# Patient Record
Sex: FEMALE | Race: WHITE | Employment: OTHER | URBAN - METROPOLITAN AREA
[De-identification: names, ages, dates, MRNs, and addresses within clinical notes are randomized per-mention and may not be internally consistent; named-entity substitution may affect disease eponyms.]

---

## 2018-07-03 PROBLEM — J06.9 URI, ACUTE: Status: ACTIVE | Noted: 2018-07-03

## 2018-12-06 ENCOUNTER — HOSPITAL ENCOUNTER (OUTPATIENT)
Dept: MAMMOGRAPHY | Age: 83
Discharge: HOME | End: 2018-12-06
Attending: INTERNAL MEDICINE
Payer: MEDICARE

## 2018-12-06 DIAGNOSIS — M81.0 OSTEOPOROSIS, UNSPECIFIED OSTEOPOROSIS TYPE, UNSPECIFIED PATHOLOGICAL FRACTURE PRESENCE: ICD-10-CM

## 2018-12-06 PROCEDURE — 77080 DXA BONE DENSITY AXIAL: CPT

## 2018-12-14 PROBLEM — M85.80 OSTEOPENIA: Status: ACTIVE | Noted: 2018-12-14

## 2018-12-14 NOTE — PROGRESS NOTES
DEXA bone density scan shows osteopenia with increased hip fracture risk so recommend Prolia injections every 6 months.  Please inform the patient

## 2018-12-14 NOTE — PROGRESS NOTES
I called and notified the pt of these results/recommendations. The pt asked that I speak with her daughter Conner Whtie - on [place]), as well. She was notified. They want to go forth with the Prolia injections, but they want ensure that none of her medications have a contraindication with the Prolia. I notified I would send the msg to Dr. Elvira Cantu, but it would be Monday before I would get back in touch with her. She verbalized understanding.

## 2019-01-16 ENCOUNTER — APPOINTMENT (OUTPATIENT)
Dept: INFUSION THERAPY | Age: 84
End: 2019-01-16

## 2019-02-13 PROBLEM — J06.9 URI, ACUTE: Status: RESOLVED | Noted: 2018-07-03 | Resolved: 2019-02-13

## 2020-03-11 ENCOUNTER — HOSPITAL ENCOUNTER (OUTPATIENT)
Dept: INFUSION THERAPY | Age: 85
Discharge: HOME | End: 2020-03-11
Payer: MEDICARE

## 2020-03-11 VITALS
OXYGEN SATURATION: 95 % | TEMPERATURE: 97.7 F | RESPIRATION RATE: 18 BRPM | SYSTOLIC BLOOD PRESSURE: 119 MMHG | DIASTOLIC BLOOD PRESSURE: 71 MMHG | HEART RATE: 72 BPM

## 2020-03-11 LAB
CALCIUM SERPL-MCNC: 9.6 MG/DL (ref 8.3–10.4)
CREAT SERPL-MCNC: 0.94 MG/DL (ref 0.6–1)

## 2020-03-11 PROCEDURE — 36415 COLL VENOUS BLD VENIPUNCTURE: CPT

## 2020-03-11 PROCEDURE — 74011250636 HC RX REV CODE- 250/636: Performed by: INTERNAL MEDICINE

## 2020-03-11 PROCEDURE — 96372 THER/PROPH/DIAG INJ SC/IM: CPT

## 2020-03-11 PROCEDURE — 82565 ASSAY OF CREATININE: CPT

## 2020-03-11 PROCEDURE — 82310 ASSAY OF CALCIUM: CPT

## 2020-03-11 RX ADMIN — DENOSUMAB 60 MG: 60 INJECTION SUBCUTANEOUS at 16:15

## 2020-03-11 NOTE — PROGRESS NOTES
Arrived to the [facility] at the [facility]. Prolia completed. Provided education on Prolia  Patient instructed to report any side affects to ordering provider.   Patient tolerated well  Any issues or concerns during appointment: No  Patient aware of next infusion appointment on Friday,September 11th @ 1400  Discharged home ambulatory

## 2020-09-11 ENCOUNTER — HOSPITAL ENCOUNTER (OUTPATIENT)
Dept: INFUSION THERAPY | Age: 85
Discharge: HOME | End: 2020-09-11
Payer: MEDICARE

## 2020-09-11 VITALS
TEMPERATURE: 97.6 F | SYSTOLIC BLOOD PRESSURE: 145 MMHG | RESPIRATION RATE: 18 BRPM | HEART RATE: 87 BPM | OXYGEN SATURATION: 95 % | DIASTOLIC BLOOD PRESSURE: 60 MMHG

## 2020-09-11 LAB — CALCIUM SERPL-MCNC: 9.8 MG/DL (ref 8.3–10.4)

## 2020-09-11 PROCEDURE — 36415 COLL VENOUS BLD VENIPUNCTURE: CPT

## 2020-09-11 PROCEDURE — 74011250636 HC RX REV CODE- 250/636: Performed by: INTERNAL MEDICINE

## 2020-09-11 PROCEDURE — 96372 THER/PROPH/DIAG INJ SC/IM: CPT

## 2020-09-11 PROCEDURE — 82310 ASSAY OF CALCIUM: CPT

## 2020-09-11 RX ADMIN — DENOSUMAB 60 MG: 60 INJECTION SUBCUTANEOUS at 14:20

## 2020-09-11 NOTE — PROGRESS NOTES
Arrived to the [facility]. Prolia completed.    Provided education on Prolia-patient has previously received this medication   Patient instructed to report any side affects to ordering provider-  Patient tolerated well   Any issues or concerns during appointment: No  Patient has no futrue appointments in [facility] @ this time  Discharged home ambulatory with daughter

## 2021-02-19 ENCOUNTER — HOSPITAL ENCOUNTER (OUTPATIENT)
Dept: MAMMOGRAPHY | Age: 86
Discharge: HOME | End: 2021-02-19
Attending: INTERNAL MEDICINE
Payer: MEDICARE

## 2021-02-19 DIAGNOSIS — M85.80 OSTEOPENIA, UNSPECIFIED LOCATION: ICD-10-CM

## 2021-02-19 DIAGNOSIS — Z78.0 POSTMENOPAUSAL: ICD-10-CM

## 2021-02-19 PROCEDURE — 77080 DXA BONE DENSITY AXIAL: CPT

## 2021-02-23 NOTE — PROGRESS NOTES
DEXA bone density scan report reviewed and shows weak bones with increased risk of fracture.   Continue Prolia

## 2021-03-11 ENCOUNTER — HOSPITAL ENCOUNTER (OUTPATIENT)
Dept: LAB | Age: 86
Discharge: HOME | End: 2021-03-11

## 2021-03-11 ENCOUNTER — HOSPITAL ENCOUNTER (OUTPATIENT)
Dept: INFUSION THERAPY | Age: 86
Discharge: HOME | End: 2021-03-11
Payer: MEDICARE

## 2021-03-11 VITALS
DIASTOLIC BLOOD PRESSURE: 83 MMHG | TEMPERATURE: 98.2 F | OXYGEN SATURATION: 99 % | SYSTOLIC BLOOD PRESSURE: 121 MMHG | HEART RATE: 63 BPM

## 2021-03-11 LAB
CALCIUM SERPL-MCNC: 10.1 MG/DL (ref 8.3–10.4)
CREAT SERPL-MCNC: 1 MG/DL (ref 0.6–1)

## 2021-03-11 PROCEDURE — 96372 THER/PROPH/DIAG INJ SC/IM: CPT

## 2021-03-11 PROCEDURE — 36415 COLL VENOUS BLD VENIPUNCTURE: CPT

## 2021-03-11 PROCEDURE — 74011250636 HC RX REV CODE- 250/636: Performed by: INTERNAL MEDICINE

## 2021-03-11 PROCEDURE — 82310 ASSAY OF CALCIUM: CPT

## 2021-03-11 PROCEDURE — 82565 ASSAY OF CREATININE: CPT

## 2021-03-11 RX ADMIN — DENOSUMAB 60 MG: 60 INJECTION SUBCUTANEOUS at 14:40

## 2021-03-11 NOTE — PROGRESS NOTES
Arrived to the [facility]. Jamari completed. Provided education on same. Patient instructed to report any side affects to ordering provider. Patient tolerated well. Any issues or concerns during appointment: none. Patient taken to scheduling to make next appt. Discharged ambulatory with family.

## 2021-05-18 ENCOUNTER — HOSPITAL ENCOUNTER (OUTPATIENT)
Dept: NUCLEAR MEDICINE | Age: 86
Discharge: HOME | End: 2021-05-18
Attending: INTERNAL MEDICINE
Payer: MEDICARE

## 2021-05-18 DIAGNOSIS — D75.9 BONE MARROW DISORDER: ICD-10-CM

## 2021-05-18 DIAGNOSIS — M25.551 HIP PAIN, ACUTE, RIGHT: ICD-10-CM

## 2021-05-18 PROCEDURE — 78306 BONE IMAGING WHOLE BODY: CPT

## 2021-07-23 ENCOUNTER — HOSPITAL ENCOUNTER (OUTPATIENT)
Dept: MAMMOGRAPHY | Age: 86
Discharge: HOME | End: 2021-07-23
Attending: INTERNAL MEDICINE
Payer: MEDICARE

## 2021-07-23 DIAGNOSIS — Z12.31 SCREENING MAMMOGRAM FOR HIGH-RISK PATIENT: ICD-10-CM

## 2021-07-23 PROCEDURE — 77067 SCR MAMMO BI INCL CAD: CPT

## 2021-08-25 ENCOUNTER — HOSPITAL ENCOUNTER (OUTPATIENT)
Dept: MAMMOGRAPHY | Age: 86
Discharge: HOME | End: 2021-08-25
Attending: INTERNAL MEDICINE
Payer: MEDICARE

## 2021-08-25 DIAGNOSIS — R92.8 ABNORMAL SCREENING MAMMOGRAM: ICD-10-CM

## 2021-08-25 PROCEDURE — 77066 DX MAMMO INCL CAD BI: CPT

## 2021-08-25 PROCEDURE — 76642 ULTRASOUND BREAST LIMITED: CPT

## 2021-08-25 NOTE — PROGRESS NOTES
Follow up breast mammogram and ultrasound report reviewed and detected only benign changes and recommend follow up scan in 6 months.

## 2021-09-13 ENCOUNTER — HOSPITAL ENCOUNTER (OUTPATIENT)
Dept: LAB | Age: 86
Discharge: HOME | End: 2021-09-13

## 2021-09-13 ENCOUNTER — HOSPITAL ENCOUNTER (OUTPATIENT)
Dept: INFUSION THERAPY | Age: 86
Discharge: HOME | End: 2021-09-13
Payer: MEDICARE

## 2021-09-13 VITALS
SYSTOLIC BLOOD PRESSURE: 132 MMHG | TEMPERATURE: 98 F | DIASTOLIC BLOOD PRESSURE: 66 MMHG | HEART RATE: 65 BPM | RESPIRATION RATE: 18 BRPM

## 2021-09-13 LAB
CALCIUM SERPL-MCNC: 10.3 MG/DL (ref 8.3–10.4)
CREAT SERPL-MCNC: 1.1 MG/DL (ref 0.6–1)

## 2021-09-13 PROCEDURE — 36415 COLL VENOUS BLD VENIPUNCTURE: CPT

## 2021-09-13 PROCEDURE — 82310 ASSAY OF CALCIUM: CPT

## 2021-09-13 PROCEDURE — 96372 THER/PROPH/DIAG INJ SC/IM: CPT

## 2021-09-13 PROCEDURE — 74011250636 HC RX REV CODE- 250/636: Performed by: INTERNAL MEDICINE

## 2021-09-13 PROCEDURE — 82565 ASSAY OF CREATININE: CPT

## 2021-09-13 RX ADMIN — DENOSUMAB 60 MG: 60 INJECTION SUBCUTANEOUS at 14:34

## 2021-09-13 NOTE — PROGRESS NOTES
Arrived to infusion. Labs reviewed and prolia given. Educated patient on injection. No concerns or issues expressed. Next prolia is due in 6 months and will need new order. Patient d/c ambulatory with family member.

## 2022-03-14 ENCOUNTER — HOSPITAL ENCOUNTER (OUTPATIENT)
Dept: LAB | Age: 87
Discharge: HOME | End: 2022-03-14

## 2022-03-14 ENCOUNTER — HOSPITAL ENCOUNTER (OUTPATIENT)
Dept: INFUSION THERAPY | Age: 87
Discharge: HOME | End: 2022-03-14
Payer: MEDICARE

## 2022-03-14 VITALS
SYSTOLIC BLOOD PRESSURE: 104 MMHG | RESPIRATION RATE: 18 BRPM | OXYGEN SATURATION: 94 % | DIASTOLIC BLOOD PRESSURE: 54 MMHG | HEART RATE: 70 BPM | TEMPERATURE: 98 F

## 2022-03-14 DIAGNOSIS — M85.80 OSTEOPENIA, UNSPECIFIED LOCATION: Primary | ICD-10-CM

## 2022-03-14 LAB
CALCIUM SERPL-MCNC: 10.2 MG/DL (ref 8.3–10.4)
CREAT SERPL-MCNC: 1.7 MG/DL (ref 0.6–1)

## 2022-03-14 PROCEDURE — 96372 THER/PROPH/DIAG INJ SC/IM: CPT

## 2022-03-14 PROCEDURE — 36415 COLL VENOUS BLD VENIPUNCTURE: CPT

## 2022-03-14 PROCEDURE — 74011250636 HC RX REV CODE- 250/636: Performed by: INTERNAL MEDICINE

## 2022-03-14 PROCEDURE — 82565 ASSAY OF CREATININE: CPT

## 2022-03-14 PROCEDURE — 82310 ASSAY OF CALCIUM: CPT

## 2022-03-14 RX ORDER — ACETAMINOPHEN 325 MG/1
650 TABLET ORAL AS NEEDED
Start: 2022-09-12

## 2022-03-14 RX ORDER — HYDROCORTISONE SODIUM SUCCINATE 100 MG/2ML
100 INJECTION, POWDER, FOR SOLUTION INTRAMUSCULAR; INTRAVENOUS AS NEEDED
OUTPATIENT
Start: 2022-09-12

## 2022-03-14 RX ORDER — DIPHENHYDRAMINE HYDROCHLORIDE 50 MG/ML
50 INJECTION, SOLUTION INTRAMUSCULAR; INTRAVENOUS AS NEEDED
Start: 2022-09-12

## 2022-03-14 RX ORDER — ONDANSETRON 2 MG/ML
8 INJECTION INTRAMUSCULAR; INTRAVENOUS AS NEEDED
OUTPATIENT
Start: 2022-09-12

## 2022-03-14 RX ORDER — DIPHENHYDRAMINE HYDROCHLORIDE 50 MG/ML
25 INJECTION, SOLUTION INTRAMUSCULAR; INTRAVENOUS AS NEEDED
Start: 2022-09-12

## 2022-03-14 RX ORDER — ALBUTEROL SULFATE 0.83 MG/ML
2.5 SOLUTION RESPIRATORY (INHALATION) AS NEEDED
Start: 2022-09-12

## 2022-03-14 RX ORDER — EPINEPHRINE 1 MG/ML
0.3 INJECTION, SOLUTION, CONCENTRATE INTRAVENOUS AS NEEDED
OUTPATIENT
Start: 2022-09-12

## 2022-03-14 RX ADMIN — DENOSUMAB 60 MG: 60 INJECTION SUBCUTANEOUS at 14:31

## 2022-03-14 NOTE — PROGRESS NOTES
Arrived to the [facility]. Prolia injection completed. Provided education on Prolia. Patient has had medication previously   Patient instructed to report any side affects to ordering provider.   Patient tolerated well  Any issues or concerns during appointment: none  Patient aware to follow up with physician for next appointment   Discharged ambulatory

## 2022-03-15 ENCOUNTER — HOSPITAL ENCOUNTER (OUTPATIENT)
Dept: MAMMOGRAPHY | Age: 87
Discharge: HOME | End: 2022-03-15
Attending: INTERNAL MEDICINE
Payer: MEDICARE

## 2022-03-15 DIAGNOSIS — N64.89 BREAST ASYMMETRY: ICD-10-CM

## 2022-03-15 PROCEDURE — 77066 DX MAMMO INCL CAD BI: CPT

## 2022-03-19 PROBLEM — M85.80 OSTEOPENIA: Status: ACTIVE | Noted: 2018-12-14

## 2022-06-06 DIAGNOSIS — F01.50 VASCULAR DEMENTIA WITHOUT BEHAVIORAL DISTURBANCE (HCC): ICD-10-CM

## 2022-06-06 RX ORDER — FLUOXETINE HYDROCHLORIDE 20 MG/5ML
LIQUID ORAL
Qty: 240 ML | Refills: 0 | Status: SHIPPED | OUTPATIENT
Start: 2022-06-06 | End: 2022-08-08

## 2022-06-06 NOTE — TELEPHONE ENCOUNTER
Pt was last seen in february but has an upcoming appointment in august rx was last sent for 60 days 1 refill was not sure if she needs to be seen sooner for this medication or it can be sent In until her next visit please advise

## 2022-08-05 DIAGNOSIS — F01.50 VASCULAR DEMENTIA WITHOUT BEHAVIORAL DISTURBANCE (HCC): ICD-10-CM

## 2022-08-08 RX ORDER — FLUOXETINE HYDROCHLORIDE 20 MG/5ML
LIQUID ORAL
Qty: 240 ML | Refills: 0 | Status: SHIPPED | OUTPATIENT
Start: 2022-08-08 | End: 2022-09-06

## 2022-08-22 ENCOUNTER — OFFICE VISIT (OUTPATIENT)
Dept: INTERNAL MEDICINE CLINIC | Facility: CLINIC | Age: 87
End: 2022-08-22
Payer: MEDICARE

## 2022-08-22 VITALS
HEIGHT: 60 IN | DIASTOLIC BLOOD PRESSURE: 87 MMHG | OXYGEN SATURATION: 97 % | SYSTOLIC BLOOD PRESSURE: 158 MMHG | WEIGHT: 119 LBS | TEMPERATURE: 97.4 F | HEART RATE: 62 BPM | BODY MASS INDEX: 23.36 KG/M2

## 2022-08-22 DIAGNOSIS — E78.49 OTHER HYPERLIPIDEMIA: ICD-10-CM

## 2022-08-22 DIAGNOSIS — F03.90 DEMENTIA WITHOUT BEHAVIORAL DISTURBANCE, UNSPECIFIED DEMENTIA TYPE: ICD-10-CM

## 2022-08-22 DIAGNOSIS — M85.80 OSTEOPENIA, UNSPECIFIED LOCATION: ICD-10-CM

## 2022-08-22 DIAGNOSIS — G62.9 PERIPHERAL POLYNEUROPATHY: ICD-10-CM

## 2022-08-22 DIAGNOSIS — N32.81 OAB (OVERACTIVE BLADDER): ICD-10-CM

## 2022-08-22 DIAGNOSIS — I10 ESSENTIAL HYPERTENSION: Primary | ICD-10-CM

## 2022-08-22 DIAGNOSIS — J30.9 ALLERGIC RHINITIS, UNSPECIFIED SEASONALITY, UNSPECIFIED TRIGGER: ICD-10-CM

## 2022-08-22 DIAGNOSIS — I10 ESSENTIAL HYPERTENSION: ICD-10-CM

## 2022-08-22 DIAGNOSIS — F31.9 BIPOLAR AFFECTIVE DISORDER, REMISSION STATUS UNSPECIFIED (HCC): ICD-10-CM

## 2022-08-22 LAB
BASOPHILS # BLD: 0 K/UL (ref 0–0.2)
BASOPHILS NFR BLD: 1 % (ref 0–2)
DIFFERENTIAL METHOD BLD: ABNORMAL
EOSINOPHIL # BLD: 0.1 K/UL (ref 0–0.8)
EOSINOPHIL NFR BLD: 3 % (ref 0.5–7.8)
ERYTHROCYTE [DISTWIDTH] IN BLOOD BY AUTOMATED COUNT: 13.6 % (ref 11.9–14.6)
HCT VFR BLD AUTO: 38.2 % (ref 35.8–46.3)
HGB BLD-MCNC: 12 G/DL (ref 11.7–15.4)
IMM GRANULOCYTES # BLD AUTO: 0 K/UL (ref 0–0.5)
IMM GRANULOCYTES NFR BLD AUTO: 0 % (ref 0–5)
LYMPHOCYTES # BLD: 1.4 K/UL (ref 0.5–4.6)
LYMPHOCYTES NFR BLD: 32 % (ref 13–44)
MCH RBC QN AUTO: 28.6 PG (ref 26.1–32.9)
MCHC RBC AUTO-ENTMCNC: 31.4 G/DL (ref 31.4–35)
MCV RBC AUTO: 91 FL (ref 79.6–97.8)
MONOCYTES # BLD: 0.4 K/UL (ref 0.1–1.3)
MONOCYTES NFR BLD: 9 % (ref 4–12)
NEUTS SEG # BLD: 2.4 K/UL (ref 1.7–8.2)
NEUTS SEG NFR BLD: 55 % (ref 43–78)
NRBC # BLD: 0 K/UL (ref 0–0.2)
PLATELET # BLD AUTO: 212 K/UL (ref 150–450)
PMV BLD AUTO: 12.6 FL (ref 9.4–12.3)
RBC # BLD AUTO: 4.2 M/UL (ref 4.05–5.2)
WBC # BLD AUTO: 4.3 K/UL (ref 4.3–11.1)

## 2022-08-22 PROCEDURE — 1123F ACP DISCUSS/DSCN MKR DOCD: CPT | Performed by: INTERNAL MEDICINE

## 2022-08-22 PROCEDURE — 1090F PRES/ABSN URINE INCON ASSESS: CPT | Performed by: INTERNAL MEDICINE

## 2022-08-22 PROCEDURE — G8427 DOCREV CUR MEDS BY ELIG CLIN: HCPCS | Performed by: INTERNAL MEDICINE

## 2022-08-22 PROCEDURE — 1036F TOBACCO NON-USER: CPT | Performed by: INTERNAL MEDICINE

## 2022-08-22 PROCEDURE — 99214 OFFICE O/P EST MOD 30 MIN: CPT | Performed by: INTERNAL MEDICINE

## 2022-08-22 PROCEDURE — G8420 CALC BMI NORM PARAMETERS: HCPCS | Performed by: INTERNAL MEDICINE

## 2022-08-22 RX ORDER — ALBUTEROL SULFATE 90 UG/1
4 AEROSOL, METERED RESPIRATORY (INHALATION) PRN
OUTPATIENT
Start: 2022-08-22

## 2022-08-22 RX ORDER — ESTRADIOL 1 MG/1
1 TABLET ORAL DAILY
Qty: 90 TABLET | Refills: 1 | Status: SHIPPED | OUTPATIENT
Start: 2022-08-22

## 2022-08-22 RX ORDER — DIPHENHYDRAMINE HYDROCHLORIDE 50 MG/ML
50 INJECTION INTRAMUSCULAR; INTRAVENOUS
OUTPATIENT
Start: 2022-08-22

## 2022-08-22 RX ORDER — MONTELUKAST SODIUM 10 MG/1
10 TABLET ORAL DAILY
Qty: 90 TABLET | Refills: 1 | Status: SHIPPED | OUTPATIENT
Start: 2022-08-22

## 2022-08-22 RX ORDER — SODIUM CHLORIDE 9 MG/ML
INJECTION, SOLUTION INTRAVENOUS CONTINUOUS
OUTPATIENT
Start: 2022-08-22

## 2022-08-22 RX ORDER — TOLTERODINE 4 MG/1
4 CAPSULE, EXTENDED RELEASE ORAL DAILY
Qty: 90 CAPSULE | Refills: 1 | Status: SHIPPED | OUTPATIENT
Start: 2022-08-22

## 2022-08-22 RX ORDER — EPINEPHRINE 1 MG/ML
0.3 INJECTION, SOLUTION, CONCENTRATE INTRAVENOUS PRN
OUTPATIENT
Start: 2022-08-22

## 2022-08-22 RX ORDER — LAMOTRIGINE 100 MG/1
100 TABLET ORAL 2 TIMES DAILY
Qty: 180 TABLET | Refills: 1 | Status: SHIPPED | OUTPATIENT
Start: 2022-08-22

## 2022-08-22 RX ORDER — GABAPENTIN 400 MG/1
400 CAPSULE ORAL 2 TIMES DAILY
Qty: 180 CAPSULE | Refills: 1 | Status: SHIPPED | OUTPATIENT
Start: 2022-08-22 | End: 2022-11-20

## 2022-08-22 RX ORDER — POTASSIUM CHLORIDE 750 MG/1
10 CAPSULE, EXTENDED RELEASE ORAL DAILY
Qty: 90 CAPSULE | Refills: 1 | Status: SHIPPED | OUTPATIENT
Start: 2022-08-22

## 2022-08-22 RX ORDER — ATORVASTATIN CALCIUM 10 MG/1
10 TABLET, FILM COATED ORAL DAILY
Qty: 90 TABLET | Refills: 1 | Status: SHIPPED | OUTPATIENT
Start: 2022-08-22

## 2022-08-22 RX ORDER — LOSARTAN POTASSIUM AND HYDROCHLOROTHIAZIDE 12.5; 1 MG/1; MG/1
1 TABLET ORAL
Qty: 90 TABLET | Refills: 1 | Status: SHIPPED | OUTPATIENT
Start: 2022-08-22

## 2022-08-22 RX ORDER — ONDANSETRON 2 MG/ML
8 INJECTION INTRAMUSCULAR; INTRAVENOUS
OUTPATIENT
Start: 2022-08-22

## 2022-08-22 RX ORDER — ACETAMINOPHEN 325 MG/1
650 TABLET ORAL
OUTPATIENT
Start: 2022-08-22

## 2022-08-22 RX ORDER — FAMOTIDINE 10 MG/ML
20 INJECTION, SOLUTION INTRAVENOUS
OUTPATIENT
Start: 2022-08-22

## 2022-08-22 SDOH — ECONOMIC STABILITY: FOOD INSECURITY: WITHIN THE PAST 12 MONTHS, THE FOOD YOU BOUGHT JUST DIDN'T LAST AND YOU DIDN'T HAVE MONEY TO GET MORE.: NEVER TRUE

## 2022-08-22 SDOH — ECONOMIC STABILITY: FOOD INSECURITY: WITHIN THE PAST 12 MONTHS, YOU WORRIED THAT YOUR FOOD WOULD RUN OUT BEFORE YOU GOT MONEY TO BUY MORE.: NEVER TRUE

## 2022-08-22 ASSESSMENT — PATIENT HEALTH QUESTIONNAIRE - PHQ9
7. TROUBLE CONCENTRATING ON THINGS, SUCH AS READING THE NEWSPAPER OR WATCHING TELEVISION: 0
8. MOVING OR SPEAKING SO SLOWLY THAT OTHER PEOPLE COULD HAVE NOTICED. OR THE OPPOSITE, BEING SO FIGETY OR RESTLESS THAT YOU HAVE BEEN MOVING AROUND A LOT MORE THAN USUAL: 0
1. LITTLE INTEREST OR PLEASURE IN DOING THINGS: 0
4. FEELING TIRED OR HAVING LITTLE ENERGY: 0
SUM OF ALL RESPONSES TO PHQ QUESTIONS 1-9: 0
9. THOUGHTS THAT YOU WOULD BE BETTER OFF DEAD, OR OF HURTING YOURSELF: 0
SUM OF ALL RESPONSES TO PHQ9 QUESTIONS 1 & 2: 0
5. POOR APPETITE OR OVEREATING: 0
10. IF YOU CHECKED OFF ANY PROBLEMS, HOW DIFFICULT HAVE THESE PROBLEMS MADE IT FOR YOU TO DO YOUR WORK, TAKE CARE OF THINGS AT HOME, OR GET ALONG WITH OTHER PEOPLE: 0
6. FEELING BAD ABOUT YOURSELF - OR THAT YOU ARE A FAILURE OR HAVE LET YOURSELF OR YOUR FAMILY DOWN: 0
SUM OF ALL RESPONSES TO PHQ QUESTIONS 1-9: 0
SUM OF ALL RESPONSES TO PHQ QUESTIONS 1-9: 0
2. FEELING DOWN, DEPRESSED OR HOPELESS: 0
3. TROUBLE FALLING OR STAYING ASLEEP: 0
SUM OF ALL RESPONSES TO PHQ QUESTIONS 1-9: 0

## 2022-08-22 ASSESSMENT — ENCOUNTER SYMPTOMS: SHORTNESS OF BREATH: 0

## 2022-08-22 ASSESSMENT — SOCIAL DETERMINANTS OF HEALTH (SDOH): HOW HARD IS IT FOR YOU TO PAY FOR THE VERY BASICS LIKE FOOD, HOUSING, MEDICAL CARE, AND HEATING?: NOT HARD AT ALL
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
## 2022-08-23 LAB
25(OH)D3 SERPL-MCNC: 42.3 NG/ML (ref 30–100)
ALBUMIN SERPL-MCNC: 4 G/DL (ref 3.2–4.6)
ALBUMIN SERPL-MCNC: 4.2 G/DL (ref 3.2–4.6)
ALBUMIN/GLOB SERPL: 1.2 {RATIO} (ref 1.2–3.5)
ALBUMIN/GLOB SERPL: 1.2 {RATIO} (ref 1.2–3.5)
ALP SERPL-CCNC: 87 U/L (ref 50–136)
ALP SERPL-CCNC: 87 U/L (ref 50–136)
ALT SERPL-CCNC: 20 U/L (ref 12–65)
ALT SERPL-CCNC: 21 U/L (ref 12–65)
ANION GAP SERPL CALC-SCNC: 7 MMOL/L (ref 7–16)
AST SERPL-CCNC: 19 U/L (ref 15–37)
AST SERPL-CCNC: 20 U/L (ref 15–37)
BILIRUB DIRECT SERPL-MCNC: 0.1 MG/DL
BILIRUB SERPL-MCNC: 0.4 MG/DL (ref 0.2–1.1)
BILIRUB SERPL-MCNC: 0.6 MG/DL (ref 0.2–1.1)
BUN SERPL-MCNC: 18 MG/DL (ref 8–23)
CALCIUM SERPL-MCNC: 10.4 MG/DL (ref 8.3–10.4)
CHLORIDE SERPL-SCNC: 104 MMOL/L (ref 98–107)
CHOLEST SERPL-MCNC: 187 MG/DL
CO2 SERPL-SCNC: 28 MMOL/L (ref 21–32)
CREAT SERPL-MCNC: 1 MG/DL (ref 0.6–1)
GLOBULIN SER CALC-MCNC: 3.4 G/DL (ref 2.3–3.5)
GLOBULIN SER CALC-MCNC: 3.4 G/DL (ref 2.3–3.5)
GLUCOSE SERPL-MCNC: 84 MG/DL (ref 65–100)
HDLC SERPL-MCNC: 92 MG/DL (ref 40–60)
HDLC SERPL: 2 {RATIO}
LDLC SERPL CALC-MCNC: 76.4 MG/DL
POTASSIUM SERPL-SCNC: 4 MMOL/L (ref 3.5–5.1)
PROT SERPL-MCNC: 7.4 G/DL (ref 6.3–8.2)
PROT SERPL-MCNC: 7.6 G/DL (ref 6.3–8.2)
SODIUM SERPL-SCNC: 139 MMOL/L (ref 136–145)
TRIGL SERPL-MCNC: 93 MG/DL (ref 35–150)
TSH, 3RD GENERATION: 3.56 UIU/ML (ref 0.36–3.74)
VLDLC SERPL CALC-MCNC: 18.6 MG/DL (ref 6–23)

## 2022-08-28 DIAGNOSIS — E78.49 OTHER HYPERLIPIDEMIA: ICD-10-CM

## 2022-08-29 RX ORDER — ATORVASTATIN CALCIUM 10 MG/1
TABLET, FILM COATED ORAL
Qty: 90 TABLET | Refills: 1 | OUTPATIENT
Start: 2022-08-29

## 2022-09-02 ENCOUNTER — TELEPHONE (OUTPATIENT)
Dept: INTERNAL MEDICINE CLINIC | Facility: CLINIC | Age: 87
End: 2022-09-02

## 2022-09-02 NOTE — TELEPHONE ENCOUNTER
Called patient daughter about a PA that was sent over for yoly  I was unable to complete the PA due to in correct information Pt did not answer the phone to have this corrected

## 2022-09-05 DIAGNOSIS — F01.50 VASCULAR DEMENTIA WITHOUT BEHAVIORAL DISTURBANCE (HCC): ICD-10-CM

## 2022-09-06 RX ORDER — FLUOXETINE HYDROCHLORIDE 20 MG/5ML
LIQUID ORAL
Qty: 240 ML | Refills: 0 | Status: SHIPPED | OUTPATIENT
Start: 2022-09-06 | End: 2022-10-05

## 2022-09-15 DIAGNOSIS — E78.49 OTHER HYPERLIPIDEMIA: ICD-10-CM

## 2022-09-16 ENCOUNTER — HOSPITAL ENCOUNTER (OUTPATIENT)
Dept: LAB | Age: 87
Discharge: HOME | End: 2022-09-19

## 2022-09-16 ENCOUNTER — HOSPITAL ENCOUNTER (OUTPATIENT)
Dept: INFUSION THERAPY | Age: 87
Discharge: HOME | End: 2022-09-16
Payer: MEDICARE

## 2022-09-16 VITALS
SYSTOLIC BLOOD PRESSURE: 154 MMHG | OXYGEN SATURATION: 95 % | DIASTOLIC BLOOD PRESSURE: 72 MMHG | RESPIRATION RATE: 16 BRPM | HEART RATE: 71 BPM | TEMPERATURE: 97.9 F

## 2022-09-16 DIAGNOSIS — M85.80 OSTEOPENIA, UNSPECIFIED LOCATION: Primary | ICD-10-CM

## 2022-09-16 LAB
ALBUMIN SERPL-MCNC: 3.7 G/DL (ref 3.2–4.6)
ALBUMIN/GLOB SERPL: 1 {RATIO} (ref 1.2–3.5)
ALP SERPL-CCNC: 82 U/L (ref 50–136)
ALT SERPL-CCNC: 20 U/L (ref 12–65)
ANION GAP SERPL CALC-SCNC: 4 MMOL/L (ref 4–13)
AST SERPL-CCNC: 20 U/L (ref 15–37)
BILIRUB SERPL-MCNC: 0.4 MG/DL (ref 0.2–1.1)
BUN SERPL-MCNC: 22 MG/DL (ref 8–23)
CALCIUM SERPL-MCNC: 10.2 MG/DL (ref 8.3–10.4)
CHLORIDE SERPL-SCNC: 100 MMOL/L (ref 101–110)
CO2 SERPL-SCNC: 32 MMOL/L (ref 21–32)
CREAT SERPL-MCNC: 1.1 MG/DL (ref 0.6–1)
GLOBULIN SER CALC-MCNC: 3.8 G/DL (ref 2.3–3.5)
GLUCOSE SERPL-MCNC: 98 MG/DL (ref 65–100)
POTASSIUM SERPL-SCNC: 4.2 MMOL/L (ref 3.5–5.1)
PROT SERPL-MCNC: 7.5 G/DL (ref 6.3–8.2)
SODIUM SERPL-SCNC: 136 MMOL/L (ref 136–145)

## 2022-09-16 PROCEDURE — 96372 THER/PROPH/DIAG INJ SC/IM: CPT

## 2022-09-16 PROCEDURE — 36415 COLL VENOUS BLD VENIPUNCTURE: CPT

## 2022-09-16 PROCEDURE — 6360000002 HC RX W HCPCS: Performed by: INTERNAL MEDICINE

## 2022-09-16 PROCEDURE — 80053 COMPREHEN METABOLIC PANEL: CPT

## 2022-09-16 RX ORDER — ALBUTEROL SULFATE 90 UG/1
4 AEROSOL, METERED RESPIRATORY (INHALATION) PRN
OUTPATIENT
Start: 2023-03-13

## 2022-09-16 RX ORDER — SODIUM CHLORIDE 9 MG/ML
INJECTION, SOLUTION INTRAVENOUS CONTINUOUS
OUTPATIENT
Start: 2023-03-13

## 2022-09-16 RX ORDER — EPINEPHRINE 1 MG/ML
0.3 INJECTION, SOLUTION, CONCENTRATE INTRAVENOUS PRN
OUTPATIENT
Start: 2023-03-13

## 2022-09-16 RX ORDER — ONDANSETRON 2 MG/ML
8 INJECTION INTRAMUSCULAR; INTRAVENOUS
OUTPATIENT
Start: 2023-03-13

## 2022-09-16 RX ORDER — DIPHENHYDRAMINE HYDROCHLORIDE 50 MG/ML
50 INJECTION INTRAMUSCULAR; INTRAVENOUS
OUTPATIENT
Start: 2023-03-13

## 2022-09-16 RX ORDER — ACETAMINOPHEN 325 MG/1
650 TABLET ORAL
OUTPATIENT
Start: 2023-03-13

## 2022-09-16 RX ADMIN — DENOSUMAB 60 MG: 60 INJECTION SUBCUTANEOUS at 14:16

## 2022-09-16 NOTE — PROGRESS NOTES
Arrived to the [facility]. Prolia completed. Provided education on Prolia    Patient instructed to report any side affects to ordering provider. Patient tolerated without complications. Any issues or concerns during appointment: NO.  Patient aware next injection due in 6 months. Discharged ambulatory.

## 2022-09-19 RX ORDER — ATORVASTATIN CALCIUM 10 MG/1
TABLET, FILM COATED ORAL
Qty: 90 TABLET | Refills: 1 | OUTPATIENT
Start: 2022-09-19

## 2022-09-26 DIAGNOSIS — I10 ESSENTIAL HYPERTENSION: ICD-10-CM

## 2022-09-26 RX ORDER — POTASSIUM CHLORIDE 750 MG/1
CAPSULE, EXTENDED RELEASE ORAL
Qty: 90 CAPSULE | Refills: 1 | OUTPATIENT
Start: 2022-09-26

## 2022-09-29 DIAGNOSIS — E78.49 OTHER HYPERLIPIDEMIA: ICD-10-CM

## 2022-09-29 RX ORDER — ATORVASTATIN CALCIUM 10 MG/1
TABLET, FILM COATED ORAL
Qty: 90 TABLET | Refills: 1 | OUTPATIENT
Start: 2022-09-29

## 2022-10-14 DIAGNOSIS — E78.49 OTHER HYPERLIPIDEMIA: ICD-10-CM

## 2022-10-14 RX ORDER — ATORVASTATIN CALCIUM 10 MG/1
TABLET, FILM COATED ORAL
Qty: 90 TABLET | Refills: 1 | OUTPATIENT
Start: 2022-10-14

## 2022-10-16 DIAGNOSIS — J30.9 ALLERGIC RHINITIS, UNSPECIFIED SEASONALITY, UNSPECIFIED TRIGGER: ICD-10-CM

## 2022-10-17 RX ORDER — MONTELUKAST SODIUM 10 MG/1
TABLET ORAL
Qty: 90 TABLET | Refills: 1 | OUTPATIENT
Start: 2022-10-17

## 2022-10-28 DIAGNOSIS — E78.49 OTHER HYPERLIPIDEMIA: ICD-10-CM

## 2022-10-31 RX ORDER — ATORVASTATIN CALCIUM 10 MG/1
TABLET, FILM COATED ORAL
Qty: 90 TABLET | Refills: 1 | OUTPATIENT
Start: 2022-10-31

## 2022-11-14 DIAGNOSIS — I10 ESSENTIAL HYPERTENSION: ICD-10-CM

## 2022-11-14 RX ORDER — LOSARTAN POTASSIUM AND HYDROCHLOROTHIAZIDE 12.5; 1 MG/1; MG/1
TABLET ORAL
Qty: 90 TABLET | Refills: 1 | OUTPATIENT
Start: 2022-11-14

## 2022-11-22 ENCOUNTER — APPOINTMENT (OUTPATIENT)
Dept: CT IMAGING | Age: 87
DRG: 866 | End: 2022-11-22
Payer: MEDICARE

## 2022-11-22 ENCOUNTER — APPOINTMENT (OUTPATIENT)
Dept: GENERAL RADIOLOGY | Age: 87
DRG: 866 | End: 2022-11-22
Payer: MEDICARE

## 2022-11-22 ENCOUNTER — HOSPITAL ENCOUNTER (INPATIENT)
Age: 87
LOS: 8 days | Discharge: NURSING, REHAB OR LONG-TERM CARE | DRG: 866 | End: 2022-11-30
Attending: STUDENT IN AN ORGANIZED HEALTH CARE EDUCATION/TRAINING PROGRAM
Payer: MEDICARE

## 2022-11-22 ENCOUNTER — HOSPITAL ENCOUNTER (EMERGENCY)
Age: 87
Discharge: ANOTHER HOSPITAL | DRG: 866 | End: 2022-11-22
Attending: EMERGENCY MEDICINE
Payer: MEDICARE

## 2022-11-22 ENCOUNTER — APPOINTMENT (OUTPATIENT)
Dept: MRI IMAGING | Age: 87
DRG: 866 | End: 2022-11-22
Attending: STUDENT IN AN ORGANIZED HEALTH CARE EDUCATION/TRAINING PROGRAM
Payer: MEDICARE

## 2022-11-22 VITALS
DIASTOLIC BLOOD PRESSURE: 118 MMHG | HEIGHT: 60 IN | HEART RATE: 70 BPM | BODY MASS INDEX: 23.56 KG/M2 | SYSTOLIC BLOOD PRESSURE: 150 MMHG | WEIGHT: 120 LBS | OXYGEN SATURATION: 100 % | TEMPERATURE: 98.8 F | RESPIRATION RATE: 16 BRPM

## 2022-11-22 DIAGNOSIS — R53.1 GENERAL WEAKNESS: ICD-10-CM

## 2022-11-22 DIAGNOSIS — N32.81 OAB (OVERACTIVE BLADDER): Primary | ICD-10-CM

## 2022-11-22 DIAGNOSIS — R19.7 DIARRHEA, UNSPECIFIED TYPE: Primary | ICD-10-CM

## 2022-11-22 DIAGNOSIS — R26.2 INABILITY TO WALK: ICD-10-CM

## 2022-11-22 PROBLEM — G93.40 ACUTE ENCEPHALOPATHY: Status: ACTIVE | Noted: 2022-11-22

## 2022-11-22 LAB
ALBUMIN SERPL-MCNC: 4.6 G/DL (ref 3.2–4.6)
ALBUMIN/GLOB SERPL: 1.4 {RATIO} (ref 0.4–1.6)
ALP SERPL-CCNC: 85 U/L (ref 45–117)
ALT SERPL-CCNC: 16 U/L (ref 13–61)
ANION GAP SERPL CALC-SCNC: 11 MMOL/L (ref 2–11)
AST SERPL-CCNC: 31 U/L (ref 15–37)
B PERT DNA SPEC QL NAA+PROBE: NOT DETECTED
BILIRUB SERPL-MCNC: 0.6 MG/DL (ref 0.2–1.1)
BORDETELLA PARAPERTUSSIS BY PCR: NOT DETECTED
BUN SERPL-MCNC: 21 MG/DL (ref 7–18)
C PNEUM DNA SPEC QL NAA+PROBE: NOT DETECTED
CALCIUM SERPL-MCNC: 10.3 MG/DL (ref 8.3–10.4)
CHLORIDE SERPL-SCNC: 99 MMOL/L (ref 98–107)
CO2 SERPL-SCNC: 26 MMOL/L (ref 21–32)
CREAT SERPL-MCNC: 0.92 MG/DL (ref 0.6–1)
ERYTHROCYTE [DISTWIDTH] IN BLOOD BY AUTOMATED COUNT: 13.7 % (ref 11.9–14.6)
FLUAV AG NPH QL IA: NEGATIVE
FLUAV H3 RNA SPEC QL NAA+PROBE: DETECTED
FLUBV AG NPH QL IA: NEGATIVE
FLUBV RNA SPEC QL NAA+PROBE: NOT DETECTED
GLOBULIN SER CALC-MCNC: 3.2 G/DL (ref 2.8–4.5)
GLUCOSE SERPL-MCNC: 99 MG/DL (ref 65–100)
HADV DNA SPEC QL NAA+PROBE: NOT DETECTED
HCOV 229E RNA SPEC QL NAA+PROBE: NOT DETECTED
HCOV HKU1 RNA SPEC QL NAA+PROBE: NOT DETECTED
HCOV NL63 RNA SPEC QL NAA+PROBE: NOT DETECTED
HCOV OC43 RNA SPEC QL NAA+PROBE: NOT DETECTED
HCT VFR BLD AUTO: 35.2 % (ref 35.8–46.3)
HGB BLD-MCNC: 11.7 G/DL (ref 11.7–15.4)
HMPV RNA SPEC QL NAA+PROBE: NOT DETECTED
HPIV1 RNA SPEC QL NAA+PROBE: NOT DETECTED
HPIV2 RNA SPEC QL NAA+PROBE: NOT DETECTED
HPIV3 RNA SPEC QL NAA+PROBE: NOT DETECTED
HPIV4 RNA SPEC QL NAA+PROBE: NOT DETECTED
M PNEUMO DNA SPEC QL NAA+PROBE: NOT DETECTED
MCH RBC QN AUTO: 28.8 PG (ref 26.1–32.9)
MCHC RBC AUTO-ENTMCNC: 33.2 G/DL (ref 31.4–35)
MCV RBC AUTO: 86.7 FL (ref 82–102)
NRBC # BLD: 0 K/UL (ref 0–0.2)
PLATELET # BLD AUTO: 169 K/UL (ref 150–450)
PMV BLD AUTO: 10.7 FL (ref 9.4–12.3)
POTASSIUM SERPL-SCNC: 3.6 MMOL/L (ref 3.5–5.1)
PROT SERPL-MCNC: 7.8 G/DL (ref 6.4–8.2)
RBC # BLD AUTO: 4.06 M/UL (ref 4.05–5.2)
RSV RNA SPEC QL NAA+PROBE: NOT DETECTED
RV+EV RNA SPEC QL NAA+PROBE: NOT DETECTED
SARS-COV-2 RDRP RESP QL NAA+PROBE: NOT DETECTED
SARS-COV-2 RNA RESP QL NAA+PROBE: NOT DETECTED
SODIUM SERPL-SCNC: 136 MMOL/L (ref 133–143)
SOURCE: NORMAL
SPECIMEN SOURCE: NORMAL
WBC # BLD AUTO: 4.1 K/UL (ref 4.3–11.1)

## 2022-11-22 PROCEDURE — 97530 THERAPEUTIC ACTIVITIES: CPT

## 2022-11-22 PROCEDURE — 1100000000 HC RM PRIVATE

## 2022-11-22 PROCEDURE — 70551 MRI BRAIN STEM W/O DYE: CPT

## 2022-11-22 PROCEDURE — 6370000000 HC RX 637 (ALT 250 FOR IP): Performed by: EMERGENCY MEDICINE

## 2022-11-22 PROCEDURE — 87804 INFLUENZA ASSAY W/OPTIC: CPT

## 2022-11-22 PROCEDURE — 87635 SARS-COV-2 COVID-19 AMP PRB: CPT

## 2022-11-22 PROCEDURE — 2580000003 HC RX 258: Performed by: STUDENT IN AN ORGANIZED HEALTH CARE EDUCATION/TRAINING PROGRAM

## 2022-11-22 PROCEDURE — 2580000003 HC RX 258: Performed by: EMERGENCY MEDICINE

## 2022-11-22 PROCEDURE — 71046 X-RAY EXAM CHEST 2 VIEWS: CPT

## 2022-11-22 PROCEDURE — 6370000000 HC RX 637 (ALT 250 FOR IP): Performed by: STUDENT IN AN ORGANIZED HEALTH CARE EDUCATION/TRAINING PROGRAM

## 2022-11-22 PROCEDURE — 6360000002 HC RX W HCPCS: Performed by: STUDENT IN AN ORGANIZED HEALTH CARE EDUCATION/TRAINING PROGRAM

## 2022-11-22 PROCEDURE — 81001 URINALYSIS AUTO W/SCOPE: CPT

## 2022-11-22 PROCEDURE — 99221 1ST HOSP IP/OBS SF/LOW 40: CPT | Performed by: PSYCHIATRY & NEUROLOGY

## 2022-11-22 PROCEDURE — 80053 COMPREHEN METABOLIC PANEL: CPT

## 2022-11-22 PROCEDURE — 85027 COMPLETE CBC AUTOMATED: CPT

## 2022-11-22 PROCEDURE — 81015 MICROSCOPIC EXAM OF URINE: CPT

## 2022-11-22 PROCEDURE — 0202U NFCT DS 22 TRGT SARS-COV-2: CPT

## 2022-11-22 PROCEDURE — 81003 URINALYSIS AUTO W/O SCOPE: CPT

## 2022-11-22 PROCEDURE — 70450 CT HEAD/BRAIN W/O DYE: CPT

## 2022-11-22 PROCEDURE — 97162 PT EVAL MOD COMPLEX 30 MIN: CPT

## 2022-11-22 RX ORDER — SODIUM CHLORIDE 0.9 % (FLUSH) 0.9 %
5-40 SYRINGE (ML) INJECTION EVERY 12 HOURS SCHEDULED
Status: DISCONTINUED | OUTPATIENT
Start: 2022-11-22 | End: 2022-11-30 | Stop reason: HOSPADM

## 2022-11-22 RX ORDER — ACETAMINOPHEN 325 MG/1
650 TABLET ORAL EVERY 6 HOURS PRN
Status: DISCONTINUED | OUTPATIENT
Start: 2022-11-22 | End: 2022-11-30 | Stop reason: HOSPADM

## 2022-11-22 RX ORDER — POTASSIUM CHLORIDE 750 MG/1
10 CAPSULE, EXTENDED RELEASE ORAL DAILY
Status: DISCONTINUED | OUTPATIENT
Start: 2022-11-22 | End: 2022-11-22

## 2022-11-22 RX ORDER — POTASSIUM CHLORIDE 750 MG/1
10 TABLET, EXTENDED RELEASE ORAL
Status: DISCONTINUED | OUTPATIENT
Start: 2022-11-23 | End: 2022-11-30 | Stop reason: HOSPADM

## 2022-11-22 RX ORDER — ATORVASTATIN CALCIUM 10 MG/1
10 TABLET, FILM COATED ORAL DAILY
Status: DISCONTINUED | OUTPATIENT
Start: 2022-11-22 | End: 2022-11-23

## 2022-11-22 RX ORDER — FLUOXETINE HYDROCHLORIDE 20 MG/5ML
20 LIQUID ORAL DAILY
Status: DISCONTINUED | OUTPATIENT
Start: 2022-11-22 | End: 2022-11-22

## 2022-11-22 RX ORDER — MONTELUKAST SODIUM 10 MG/1
10 TABLET ORAL DAILY
Status: DISCONTINUED | OUTPATIENT
Start: 2022-11-22 | End: 2022-11-30 | Stop reason: HOSPADM

## 2022-11-22 RX ORDER — LOPERAMIDE HYDROCHLORIDE 2 MG/1
2 CAPSULE ORAL
Status: COMPLETED | OUTPATIENT
Start: 2022-11-22 | End: 2022-11-22

## 2022-11-22 RX ORDER — ACETAMINOPHEN 650 MG/1
650 SUPPOSITORY RECTAL EVERY 6 HOURS PRN
Status: DISCONTINUED | OUTPATIENT
Start: 2022-11-22 | End: 2022-11-30 | Stop reason: HOSPADM

## 2022-11-22 RX ORDER — TOLTERODINE 4 MG/1
4 CAPSULE, EXTENDED RELEASE ORAL DAILY
Status: DISCONTINUED | OUTPATIENT
Start: 2022-11-22 | End: 2022-11-22

## 2022-11-22 RX ORDER — ONDANSETRON 2 MG/ML
4 INJECTION INTRAMUSCULAR; INTRAVENOUS EVERY 6 HOURS PRN
Status: DISCONTINUED | OUTPATIENT
Start: 2022-11-22 | End: 2022-11-30 | Stop reason: HOSPADM

## 2022-11-22 RX ORDER — POLYETHYLENE GLYCOL 3350 17 G/17G
17 POWDER, FOR SOLUTION ORAL DAILY PRN
Status: DISCONTINUED | OUTPATIENT
Start: 2022-11-22 | End: 2022-11-29

## 2022-11-22 RX ORDER — SODIUM CHLORIDE 0.9 % (FLUSH) 0.9 %
5-40 SYRINGE (ML) INJECTION PRN
Status: DISCONTINUED | OUTPATIENT
Start: 2022-11-22 | End: 2022-11-30 | Stop reason: HOSPADM

## 2022-11-22 RX ORDER — CETIRIZINE HYDROCHLORIDE 5 MG/1
5 TABLET ORAL DAILY
Status: DISCONTINUED | OUTPATIENT
Start: 2022-11-22 | End: 2022-11-30 | Stop reason: HOSPADM

## 2022-11-22 RX ORDER — HYDROCHLOROTHIAZIDE 25 MG/1
12.5 TABLET ORAL DAILY
Status: DISCONTINUED | OUTPATIENT
Start: 2022-11-22 | End: 2022-11-30 | Stop reason: HOSPADM

## 2022-11-22 RX ORDER — GABAPENTIN 400 MG/1
400 CAPSULE ORAL 2 TIMES DAILY
Status: DISCONTINUED | OUTPATIENT
Start: 2022-11-22 | End: 2022-11-23

## 2022-11-22 RX ORDER — SODIUM CHLORIDE 9 MG/ML
INJECTION, SOLUTION INTRAVENOUS CONTINUOUS
Status: ACTIVE | OUTPATIENT
Start: 2022-11-22 | End: 2022-11-23

## 2022-11-22 RX ORDER — LOSARTAN POTASSIUM AND HYDROCHLOROTHIAZIDE 12.5; 1 MG/1; MG/1
1 TABLET ORAL
Status: DISCONTINUED | OUTPATIENT
Start: 2022-11-22 | End: 2022-11-22

## 2022-11-22 RX ORDER — VITAMIN E 268 MG
800 CAPSULE ORAL 2 TIMES DAILY
Status: DISCONTINUED | OUTPATIENT
Start: 2022-11-22 | End: 2022-11-30 | Stop reason: HOSPADM

## 2022-11-22 RX ORDER — 0.9 % SODIUM CHLORIDE 0.9 %
500 INTRAVENOUS SOLUTION INTRAVENOUS
Status: COMPLETED | OUTPATIENT
Start: 2022-11-22 | End: 2022-11-22

## 2022-11-22 RX ORDER — FLUTICASONE PROPIONATE 50 MCG
1 SPRAY, SUSPENSION (ML) NASAL DAILY
Status: DISCONTINUED | OUTPATIENT
Start: 2022-11-22 | End: 2022-11-30 | Stop reason: HOSPADM

## 2022-11-22 RX ORDER — ATORVASTATIN CALCIUM 40 MG/1
80 TABLET, FILM COATED ORAL NIGHTLY
Status: DISCONTINUED | OUTPATIENT
Start: 2022-11-22 | End: 2022-11-23

## 2022-11-22 RX ORDER — TROSPIUM CHLORIDE 20 MG/1
20 TABLET, FILM COATED ORAL
Status: DISCONTINUED | OUTPATIENT
Start: 2022-11-22 | End: 2022-11-30 | Stop reason: HOSPADM

## 2022-11-22 RX ORDER — LOSARTAN POTASSIUM 50 MG/1
100 TABLET ORAL DAILY
Status: DISCONTINUED | OUTPATIENT
Start: 2022-11-22 | End: 2022-11-24

## 2022-11-22 RX ORDER — ONDANSETRON 4 MG/1
4 TABLET, ORALLY DISINTEGRATING ORAL EVERY 8 HOURS PRN
Status: DISCONTINUED | OUTPATIENT
Start: 2022-11-22 | End: 2022-11-30 | Stop reason: HOSPADM

## 2022-11-22 RX ORDER — ESTRADIOL 1 MG/1
1 TABLET ORAL DAILY
Status: DISCONTINUED | OUTPATIENT
Start: 2022-11-22 | End: 2022-11-30 | Stop reason: HOSPADM

## 2022-11-22 RX ORDER — SODIUM CHLORIDE 9 MG/ML
INJECTION, SOLUTION INTRAVENOUS PRN
Status: DISCONTINUED | OUTPATIENT
Start: 2022-11-22 | End: 2022-11-30 | Stop reason: HOSPADM

## 2022-11-22 RX ORDER — LAMOTRIGINE 100 MG/1
100 TABLET ORAL 2 TIMES DAILY
Status: DISCONTINUED | OUTPATIENT
Start: 2022-11-22 | End: 2022-11-30 | Stop reason: HOSPADM

## 2022-11-22 RX ORDER — ASPIRIN 81 MG/1
81 TABLET ORAL DAILY
Status: DISCONTINUED | OUTPATIENT
Start: 2022-11-22 | End: 2022-11-30 | Stop reason: HOSPADM

## 2022-11-22 RX ORDER — FLUOXETINE 10 MG/1
20 CAPSULE ORAL DAILY
Status: DISCONTINUED | OUTPATIENT
Start: 2022-11-22 | End: 2022-11-30 | Stop reason: HOSPADM

## 2022-11-22 RX ORDER — ENOXAPARIN SODIUM 100 MG/ML
40 INJECTION SUBCUTANEOUS EVERY 24 HOURS
Status: DISCONTINUED | OUTPATIENT
Start: 2022-11-22 | End: 2022-11-30 | Stop reason: HOSPADM

## 2022-11-22 RX ADMIN — LAMOTRIGINE 100 MG: 100 TABLET ORAL at 20:51

## 2022-11-22 RX ADMIN — ACETAMINOPHEN 650 MG: 325 TABLET ORAL at 16:26

## 2022-11-22 RX ADMIN — MONTELUKAST 10 MG: 10 TABLET, FILM COATED ORAL at 14:28

## 2022-11-22 RX ADMIN — FLUTICASONE PROPIONATE 1 SPRAY: 50 SPRAY, METERED NASAL at 14:29

## 2022-11-22 RX ADMIN — SODIUM CHLORIDE: 9 INJECTION, SOLUTION INTRAVENOUS at 14:28

## 2022-11-22 RX ADMIN — ENOXAPARIN SODIUM 40 MG: 100 INJECTION SUBCUTANEOUS at 20:51

## 2022-11-22 RX ADMIN — ESTRADIOL 1 MG: 1 TABLET ORAL at 16:22

## 2022-11-22 RX ADMIN — Medication 1 TABLET: at 16:22

## 2022-11-22 RX ADMIN — ASPIRIN 81 MG: 81 TABLET ORAL at 14:28

## 2022-11-22 RX ADMIN — FLUOXETINE HYDROCHLORIDE 20 MG: 10 CAPSULE ORAL at 14:28

## 2022-11-22 RX ADMIN — LOPERAMIDE HYDROCHLORIDE 2 MG: 2 CAPSULE ORAL at 08:55

## 2022-11-22 RX ADMIN — ATORVASTATIN CALCIUM 80 MG: 40 TABLET, FILM COATED ORAL at 20:50

## 2022-11-22 RX ADMIN — Medication 800 UNITS: at 20:50

## 2022-11-22 RX ADMIN — GABAPENTIN 400 MG: 400 CAPSULE ORAL at 20:50

## 2022-11-22 RX ADMIN — CETIRIZINE HYDROCHLORIDE 5 MG: 5 TABLET ORAL at 14:28

## 2022-11-22 RX ADMIN — TROSPIUM CHLORIDE 20 MG: 20 TABLET, FILM COATED ORAL at 16:22

## 2022-11-22 RX ADMIN — SODIUM CHLORIDE 500 ML: 9 INJECTION, SOLUTION INTRAVENOUS at 05:14

## 2022-11-22 ASSESSMENT — LIFESTYLE VARIABLES
HOW MANY STANDARD DRINKS CONTAINING ALCOHOL DO YOU HAVE ON A TYPICAL DAY: PATIENT DOES NOT DRINK
HOW OFTEN DO YOU HAVE A DRINK CONTAINING ALCOHOL: NEVER
HOW MANY STANDARD DRINKS CONTAINING ALCOHOL DO YOU HAVE ON A TYPICAL DAY: PATIENT DOES NOT DRINK
HOW OFTEN DO YOU HAVE A DRINK CONTAINING ALCOHOL: NEVER

## 2022-11-22 ASSESSMENT — ENCOUNTER SYMPTOMS
ABDOMINAL PAIN: 0
VOMITING: 1
DIARRHEA: 0

## 2022-11-22 ASSESSMENT — PAIN SCALES - GENERAL
PAINLEVEL_OUTOF10: 0

## 2022-11-22 ASSESSMENT — PAIN - FUNCTIONAL ASSESSMENT
PAIN_FUNCTIONAL_ASSESSMENT: 0-10
PAIN_FUNCTIONAL_ASSESSMENT: NONE - DENIES PAIN

## 2022-11-22 NOTE — ED NOTES
Pt ambulatory to the bathroom. Pt attempted to give urine sample. Pt missed the hat. No able to obtain sample.       Sushma Doyle, SAUL  11/22/22 4573

## 2022-11-22 NOTE — ED TRIAGE NOTES
Pt to the ED from home via EMS for c/o of diarrhea and weakness. Pt states that she had 2 episodes of diarrhea prior to arrival to the ED. Pt states that she felt \"weak\" yesterday day. No fever, no abd pain.

## 2022-11-22 NOTE — ED NOTES
Called to give report. Was informed that receiving nurse is \"in Rounds with the Physicians for another 15minutes. \"   Waiting for a return call.      Mohammed Nyhan, RN  11/22/22 1016

## 2022-11-22 NOTE — ED NOTES
Report given to BRYCE Cervantes 8th floor [facility]. [address] dispatch notified. [facility] transporting patient to room 825.      Jessie Gonzalez, SAUL  11/22/22 9650

## 2022-11-22 NOTE — PROGRESS NOTES
Patient received to room 825 via stretcher as a direct admit from [facility] ER  Hospitalist notified via [address] [place] [address]

## 2022-11-22 NOTE — H&P
Hospitalist History and Physical   Admit Date:  2022 11:14 AM   Name:  Manny Ardon   Age:  80 y.o. Sex:  female  :  1935   MRN:  [id number]   Room:  [facility]/    Presenting Complaint: No chief complaint on file. Reason(s) for Admission: Acute encephalopathy [G93.40]     History of Present Illness: Manny Ardon is a 80 y.o. female with past medical history of     HTN, Dementia, HLD and Bipolar disorder who presents via EMS with weakness and diarrhea. She is hard of hearing and the history is obtained from the LETY at the bedside. She lives with her LETY and daughter. As per LETY, yesterday night she was very confused unable to take the medications. She was not answering questions appropriately. Today morning at 2:00 AM, she had 2 episodes of diarrhea.s he was unable to walk and take few steps. She had fever 103F. She complains of cough, nasal congestion and sore throat. Her daughter had pacemaker and was unable to take care of the mother. LETY stated he cannot take care of the MIL if she is unable to walk. Her baseline she walks independently. She was diagnosed with flu recently. She denies chest pain, nausea, vomiting, diarrhea, dysuria, urinary frequency, nocturia      Review of Systems:  10 systems reviewed and negative except as noted in HPI. Assessment & Plan:       Acute encephalopathy  Baseline AAOx3 and currently AO x3    CT head showed chronic appearing white matter change without acute intracranial   abnormality. Follow-up with UA  F/u MRI  Follow-up with ammonia, Z95, folic acid  Fall precautions  Neurology consulted    Diarrhea  Gentle IV hydration  Follow-up with stool for C. difficile, ova and parasites, cultures,   Imodium is helping  Continue IVF     Hypertension  Continue home medications    Dementia  Continue home medications    Bipolar disorder  Continue home medications    Anticipated discharge needs:   [address] in 1 to 2 days    Diet: ADULT DIET; Regular;  Low Stroke Father     Heart Attack Brother         age 36    Stroke Brother     Heart Disease Brother 36        MI    Depression Sister     Other Sister         DVT of leg    Breast Cancer Sister 80    Cancer Sister         stage 4 breast    Cancer Mother         vulvar        Immunization History   Administered Date(s) Administered    COVID-19, PFIZER PURPLE top, DILUTE for use, (age 15 y+), 30mcg/0.3mL 02/18/2022, 03/11/2022    Influenza Trivalent 10/07/2014    Influenza Virus Vaccine 01/13/2014    Influenza, FLUAD, (age 72 y+), Adjuvanted, 0.5mL 10/14/2020    Influenza, High Dose (Fluzone 65 yrs and older) 10/03/2016, 10/10/2017, 10/03/2018    Influenza, Triv, inactivated, subunit, adjuvanted, IM (Fluad 65 yrs and older) 10/08/2019    Influenza, Trivalent PF 09/15/2015    Pneumococcal Conjugate 13-valent ([id number]) 01/26/2016    Pneumococcal Polysaccharide ([id number]) 10/07/2014    Pneumococcal Vaccine 01/01/2001    Tdap (Boostrix, Adacel) 01/30/2014    Zoster Live (Zostavax) 07/08/2014    Zoster Recombinant (Shingrix) 01/05/2021     Allergies   Allergen Reactions    Codeine Shortness Of Breath    Benztropine Other (See Comments)     \"stroke-like symptoms\"  Couldn't write, confused (per daughter Gini Elaine)    Haloperidol Other (See Comments)     \"agitated\" per daughter    Haloperidol Lactate Other (See Comments)    Trazodone Other (See Comments)     \"Stroke-like: mouth drooped, speech slurred\" per daughter     Prior to Admit Medications:  Current Outpatient Medications   Medication Instructions    aspirin 81 mg, Oral    atorvastatin (LIPITOR) 10 mg, Oral, DAILY, TAKE 1 TABLET BY MOUTH NIGHTLY    Calcium Carb-Cholecalciferol 600-800 MG-UNIT CHEW 1 tablet, Oral, DAILY    estradiol (ESTRACE) 1 mg, Oral, DAILY    FLUoxetine (PROZAC) 20 MG/5ML solution TAKE 1.25ML BY MOUTH IN THE MORNING AND 2.5ML IN THE EVENING    fluticasone (FLONASE) 50 MCG/ACT nasal spray INSTILL 2 SPRAYS INTO BOTH NOSTRILS ONCE DAILY    gabapentin (NEURONTIN) 400 mg, Oral, 2 times daily, TAKE ONE CAPSULE BY MOUTH TWICE DAILY    lamoTRIgine (LAMICTAL) 100 mg, Oral, 2 TIMES DAILY, TAKE 1 TABLET BY MOUTH TWICE A DAY    loratadine (CLARITIN) 10 mg, Oral    losartan-hydroCHLOROthiazide (HYZAAR) 100-12.5 MG per tablet 1 tablet, Oral, DAILY WITH DINNER    montelukast (SINGULAIR) 10 mg, Oral, DAILY    potassium chloride (MICRO-K) 10 MEQ extended release capsule 10 mEq, Oral, DAILY, TAKE 1 CAPSULE BY MOUTH EVERY DAY    tolterodine (DETROL LA) 4 mg, Oral, DAILY, TAKE 1 CAPSULE BY MOUTH EVERY DAY    vitamin E 800 Units, Oral, 2 TIMES DAILY         Objective:   Patient Vitals for the past 24 hrs:   Temp Pulse Resp BP SpO2   11/22/22 1125 98.8 °F (37.1 °C) 65 17 (!) 112/59 95 %       Oxygen Therapy  SpO2: 95 %    Estimated body mass index is 23.98 kg/m² as calculated from the following:    Height as of this encounter: 5' 2\" (1.575 m). Weight as of this encounter: 131 lb 1.6 oz (59.5 kg). No intake or output data in the 24 hours ending 11/22/22 1314      Physical Exam:    Blood pressure (!) 112/59, pulse 65, temperature 98.8 °F (37.1 °C), temperature source Oral, resp. rate 17, height 5' 2\" (1.575 m), weight 131 lb 1.6 oz (59.5 kg), SpO2 95 %. General:    Well nourished. Head:  Normocephalic, atraumatic  Eyes:  Sclerae appear normal.  Pupils equally round. ENT:  Nares appear normal, no drainage. Moist oral mucosa  Neck:  No restricted ROM. Trachea midline   CV:   RRR. No m/r/g. No jugular venous distension. Lungs:   CTAB. No wheezing, rhonchi, or rales. Symmetric expansion. Abdomen: Bowel sounds present. Soft, nontender, nondistended. Extremities: No cyanosis or clubbing. No edema  Skin:     No rashes and normal coloration. Warm and dry. Neuro:  CN II-XII grossly intact. Sensation intact. A&Ox3  Psych:  Normal mood and affect.       I have personally reviewed labs and tests showing:  Recent Labs:  Recent Results (from the past 24 hour(s))   CBC Collection Time: 11/22/22  5:09 AM   Result Value Ref Range    WBC 4.1 (L) 4.3 - 11.1 K/uL    RBC 4.06 4.05 - 5.20 M/uL    Hemoglobin 11.7 11.7 - 15.4 g/dL    Hematocrit 35.2 (L) 35.8 - 46.3 %    MCV 86.7 82.0 - 102.0 FL    MCH 28.8 26.1 - 32.9 PG    MCHC 33.2 31.4 - 35.0 g/dL    RDW 13.7 11.9 - 14.6 %    Platelets 140 018 - 773 K/uL    MPV 10.7 9.4 - 12.3 FL    nRBC 0.00 0.0 - 0.2 K/uL   Comprehensive Metabolic Panel    Collection Time: 11/22/22  5:09 AM   Result Value Ref Range    Sodium 136 133 - 143 mmol/L    Potassium 3.6 3.5 - 5.1 mmol/L    Chloride 99 98 - 107 mmol/L    CO2 26 21 - 32 mmol/L    Anion Gap 11 2 - 11 mmol/L    Glucose 99 65 - 100 mg/dL    BUN 21 (H) 7.0 - 18.0 MG/DL    Creatinine 0.92 0.6 - 1.0 MG/DL    Est, Glom Filt Rate >60 >60 ml/min/1.73m2    Calcium 10.3 8.3 - 10.4 MG/DL    Total Bilirubin 0.6 0.2 - 1.1 MG/DL    ALT 16 13.0 - 61.0 U/L    AST 31 15 - 37 U/L    Alk Phosphatase 85 45.0 - 117.0 U/L    Total Protein 7.8 6.4 - 8.2 g/dL    Albumin 4.6 3.2 - 4.6 g/dL    Globulin 3.2 2.8 - 4.5 g/dL    Albumin/Globulin Ratio 1.4 0.4 - 1.6     Rapid influenza A/B antigens    Collection Time: 11/22/22  6:11 AM    Specimen: Nasal Washing   Result Value Ref Range    Influenza A Ag Negative NEG      Influenza B Ag Negative NEG      Source Nasopharyngeal     COVID-19, Rapid    Collection Time: 11/22/22  6:11 AM    Specimen: Nasopharyngeal   Result Value Ref Range    Source Nasopharyngeal      SARS-CoV-2, Rapid Not detected NOTD         I have personally reviewed imaging studies showing:  XR CHEST (2 VW)    Result Date: 11/22/2022  EXAM: Chest x-ray. INDICATION: Cough and fever. COMPARISON: None. TECHNIQUE: Frontal and lateral view chest x-ray. FINDINGS: The lungs are clear. The cardiac size, mediastinal contour and pulmonary vasculature are normal. No pneumothorax or pleural effusion is seen. There are degenerative changes in the spine and atherosclerotic calcifications in the aortic arch.      No acute process. CT HEAD WO CONTRAST    Result Date: 11/22/2022  History: Hypertension, dementia Exam: CT head without contrast Technique: Thin section axial CT images were obtained from the skullbase through the vertex. Radiation dose reduction techniques were used for this study. Our CT scanners use one or all of the following: Automated exposure control, adjustment of the mA and/or kV according to patient size, use of iterative reconstruction. Findings: There is generalized cerebral atrophy with chronic appearing white matter change in the corona radiata and centrum semiovale. The ventricles are normal in size, shape, and position. No extra-axial fluid collection is present. There is no mass or mass-effect. The basilar cisterns are patent. The paranasal sinuses and mastoid air cells are clear. Chronic appearing white matter change without acute intracranial abnormality. Echocardiogram:  No results found for this or any previous visit. Orders Placed This Encounter   Medications    sodium chloride flush 0.9 % injection 5-40 mL    sodium chloride flush 0.9 % injection 5-40 mL    0.9 % sodium chloride infusion    enoxaparin (LOVENOX) injection 40 mg     Order Specific Question:   Indication of Use     Answer:   Prophylaxis-DVT/PE    OR Linked Order Group     ondansetron (ZOFRAN-ODT) disintegrating tablet 4 mg     ondansetron (ZOFRAN) injection 4 mg    polyethylene glycol (GLYCOLAX) packet 17 g    OR Linked Order Group     acetaminophen (TYLENOL) tablet 650 mg     acetaminophen (TYLENOL) suppository 650 mg         Signed:  Edi Silverman MD    Part of this note may have been written by using a voice dictation software. The note has been proof read but may still contain some grammatical/other typographical errors.

## 2022-11-22 NOTE — PROGRESS NOTES
ACUTE PHYSICAL THERAPY GOALS:   (Developed with and agreed upon by patient and/or caregiver. )  LTG:  (1.)Ms. Cassidy Magaña will move from supine to sit and sit to supine , scoot up and down, and roll side to side in bed with INDEPENDENT within 5 treatment day(s). (2.)Ms. Cassidy Magaña will transfer from bed to chair and chair to bed with SUPERVISION using the least restrictive device within 5 treatment day(s). (3.)Ms. Cassidy Magaña will ambulate with SUPERVISION for >150 feet with the least restrictive device within 5 treatment day(s). PHYSICAL THERAPY Initial Assessment and PM  (Link to Caseload Tracking: PT Visit Days : 1  Acknowledge Orders  Time In/Out  PT Charge Capture  Rehab Caseload Tracker    Rafa Gallo is a 80 y.o. female   PRIMARY DIAGNOSIS: Acute encephalopathy  Acute encephalopathy [G93.40]       Reason for Referral: Generalized Muscle Weakness (M62.81)  Difficulty in walking, Not elsewhere classified (R26.2)  Inpatient: Payor: Mario Trejo / Plan: MEDICARE PART A AND B / Product Type: *No Product type* /     ASSESSMENT:     REHAB RECOMMENDATIONS:   Recommendation to date pending progress:  Setting:  Home Health Therapy    Equipment:    None - she gets confused with the use of an assistive device and tends to push it aside. ASSESSMENT:  Ms. Cassidy Magaña is an 80year old WF with an admitting diagnosis of acute encephalopathy. Her PMH includes HTN, dementia, bipolar. She presents sitting up in the bed with her lunch tray before her. She stated she did not want to eat because her stomach has been a problem. She can be difficult to understand at times during conversation but she was oriented to her name and the hospital.  Her BLE AROM is WFLS. She transitioned to the EOB with SBA. She stood with CGA and asked to use the bathroom. She ambulated 15' to the bathroom. She was CGA for sit to stand from the toilet and needed manual and verbal cues for putting on a new brief.   She then ambulated to the sink and washed her hands with CGA. She ambulated 36' using the RW with min assist but the RW confused her and she then was transitioned to [facility] which allowed her to walk more steady and with improved safety. She returned to her bed and stated she was very tired and wanted to sleep  Ms. Scot Velasco is functioning below her baseline and would benefit from continued skilled PT to address her safety with mobility and gait.        [address] [facility] [address] [id number] AM-PAC 6 Clicks Basic Mobility Inpatient Short Form  AM-PAC Mobility Inpatient   How much difficulty turning over in bed?: None  How much difficulty sitting down on / standing up from a chair with arms?: A Little  How much difficulty moving from lying on back to sitting on side of bed?: A Little  How much help from another person moving to and from a bed to a chair?: A Little  How much help from another person needed to walk in hospital room?: A Little  How much help from another person for climbing 3-5 steps with a railing?: A Little  AM-[facility] Inpatient Mobility Raw Score : 19  AM-PAC Inpatient T-Scale Score : 45.44  Mobility Inpatient CMS 0-100% Score: 41.77  Mobility Inpatient CMS G-Code Modifier : CK    SUBJECTIVE:   Ms. Scot Velasco states, \"I really need to go to the bathroom\"    Social/Functional Lives With: Family  Type of Home: House    OBJECTIVE:     PAIN: Mckinley Haggis / O2: PRECAUTION / Oscar Dumont / Hilary Dylan:   Pre Treatment: 0/10         Post Treatment: 0/10 Vitals BP (!) 112/59   Pulse 65   Temp 98.8 °F (37.1 °C) (Oral)   Resp 17   Ht 5' 2\" (1.575 m)   Wt 131 lb 1.6 oz (59.5 kg)   SpO2 95%   BMI 23.98 kg/m²         Oxygen - room air          RESTRICTIONS/PRECAUTIONS: - fall risk                    GROSS EVALUATION: Intact Impaired (Comments):   AROM [x]     PROM [x]    Strength [x]     Balance []  Unsteady when on her feet   Posture [] Forward Head  Thoracic Kyphosis   Sensation [x]     Coordination []   NT   Tone [x]     Edema [x]    Activity Tolerance []  Decreased - States she feels very tired     []      COGNITION/  PERCEPTION: Intact Impaired (Comments):   Orientation []  To name and place   Vision [x]     Hearing [x]     Cognition  []  Confused in conversation and is a safety risk at this time     MOBILITY: I Mod I S SBA CGA Min Mod Max Total  NT x2 Comments:   Bed Mobility    Rolling [x] [] [] [] [] [] [] [] [] [] []    Supine to Sit [x] [] [] [] [] [] [] [] [] [] []    Scooting [] [] [] [] [x] [] [] [] [] [] []    Sit to Supine [x] [] [] [] [] [] [] [] [] [] []    Transfers    Sit to Stand [] [] [] [] [x] [] [] [] [] [] []    Bed to Chair [] [] [] [] [x] [] [] [] [] [] []    Stand to Sit [] [] [] [] [x] [] [] [] [] [] []     [] [] [] [] [] [] [] [] [] [] []    I=Independent, Mod I=Modified Independent, S=Supervision, SBA=Standby Assistance, CGA=Contact Guard Assistance,   Min=Minimal Assistance, Mod=Moderate Assistance, Max=Maximal Assistance, Total=Total Assistance, NT=Not Tested    GAIT: I Mod I S SBA CGA Min Mod Max Total  NT x2 Comments:   Level of Assistance [] [] [] [] [x] [x] [] [] [] [] []    Distance   40 feet    DME HHA - tried RW but she was confused with use of it and it made ambulation harder    Gait Quality Narrow base of support, Path deviations , and Trunk flexion    Weightbearing Status  N/A    Stairs  NT    I=Independent, Mod I=Modified Independent, S=Supervision, SBA=Standby Assistance, CGA=Contact Guard Assistance,   Min=Minimal Assistance, Mod=Moderate Assistance, Max=Maximal Assistance, Total=Total Assistance, NT=Not Tested    PLAN:   FREQUENCY AND DURATION: 3 times/week for duration of hospital stay or until stated goals are met, whichever comes first.    THERAPY PROGNOSIS: Good    PROBLEM LIST:   (Skilled intervention is medically necessary to address:)  Decreased ADL/Functional Activities  Decreased Activity Tolerance  Decreased Balance  Decreased Cognition  Decreased Gait Ability  Decreased Strength  Decreased Transfer Abilities INTERVENTIONS PLANNED:   (Benefits and precautions of physical therapy have been discussed with the patient.)  Therapeutic Activity  Therapeutic Exercise/HEP  Gait Training       TREATMENT:   EVALUATION: LOW COMPLEXITY: (Untimed Charge)    TREATMENT:   Therapeutic Activity (26 Minutes): Therapeutic activity included Rolling, Supine to Sit, Sit to Supine, Transfer Training, Ambulation on level ground, Sitting balance , Standing balance, and toilet transfer to improve functional Activity tolerance, Mobility, and Strength.       AFTER TREATMENT PRECAUTIONS: Alarm Activated, Bed, Bed/Chair Locked, Needs within reach, and RN notified    INTERDISCIPLINARY COLLABORATION:  RN/ PCT    EDUCATION:      TIME IN/OUT:  Time In: 1354  Time Out: [address]  Minutes: [facility] [address] [place] [address]
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
## 2022-11-22 NOTE — ED NOTES
Inserted 20 gauge IV in left forearm. Patient to CT scan and possible admission to Other facility. Patient tolerated IV insertion well.      Nessa Cross RN  11/22/22 0062
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
## 2022-11-22 NOTE — ED PROVIDER NOTES
I took over care of this patient at shift change at 0 700 pending patient was set to be discharged home when the son had to come into the the room he states that he is concerned that she is unable to ambulate. He is concerned for her safety at home. He states that she is normally able to run around the house but she has had an acute change. I have added on a CT of her head to rule out any other abnormalities this showed chronic matter no acute intracranial abnormalities. At this point I think patient should be admitted for further testing of her off-balance and inability to walk and generalized weakness.      Alexander Weiner, DO  11/22/22 7286

## 2022-11-22 NOTE — PROGRESS NOTES
TRANSFER - IN REPORT:    Verbal report received from rn(name) on [address] [place]  being received from [place] er(unit) for routine progression of patient care      Report consisted of patients Situation, Background, Assessment and   Recommendations(SBAR). Information from the following report(s) ED SBAR was reviewed with the receiving nurse. Opportunity for questions and clarification was provided.       Assessment completed upon patients arrival to unit and care assume    Awaiting arrival, report to [facility]

## 2022-11-23 PROBLEM — J10.1 INFLUENZA A: Status: ACTIVE | Noted: 2022-11-23

## 2022-11-23 LAB
ALBUMIN SERPL-MCNC: 3.2 G/DL (ref 3.2–4.6)
ALBUMIN/GLOB SERPL: 1 {RATIO} (ref 0.4–1.6)
ALP SERPL-CCNC: 56 U/L (ref 50–136)
ALT SERPL-CCNC: 23 U/L (ref 12–65)
AMMONIA PLAS-SCNC: 19 UMOL/L (ref 11–32)
ANION GAP SERPL CALC-SCNC: 9 MMOL/L (ref 2–11)
APPEARANCE UR: CLEAR
AST SERPL-CCNC: 27 U/L (ref 15–37)
BACTERIA URNS QL MICRO: NORMAL /HPF
BASOPHILS # BLD: 0 K/UL (ref 0–0.2)
BASOPHILS NFR BLD: 0 % (ref 0–2)
BILIRUB SERPL-MCNC: 0.4 MG/DL (ref 0.2–1.1)
BILIRUB UR QL: NEGATIVE
BUN SERPL-MCNC: 18 MG/DL (ref 8–23)
CALCIUM SERPL-MCNC: 8.2 MG/DL (ref 8.3–10.4)
CASTS URNS QL MICRO: 0 /LPF
CHLORIDE SERPL-SCNC: 105 MMOL/L (ref 101–110)
CO2 SERPL-SCNC: 21 MMOL/L (ref 21–32)
COLOR UR: ABNORMAL
CREAT SERPL-MCNC: 0.9 MG/DL (ref 0.6–1)
CRYSTALS URNS QL MICRO: 0 /LPF
DIFFERENTIAL METHOD BLD: ABNORMAL
EOSINOPHIL # BLD: 0 K/UL (ref 0–0.8)
EOSINOPHIL NFR BLD: 0 % (ref 0.5–7.8)
EPI CELLS #/AREA URNS HPF: NORMAL /HPF
ERYTHROCYTE [DISTWIDTH] IN BLOOD BY AUTOMATED COUNT: 14 % (ref 11.9–14.6)
FOLATE SERPL-MCNC: 17 NG/ML (ref 3.1–17.5)
GLOBULIN SER CALC-MCNC: 3.2 G/DL (ref 2.8–4.5)
GLUCOSE SERPL-MCNC: 104 MG/DL (ref 65–100)
GLUCOSE UR STRIP.AUTO-MCNC: NEGATIVE MG/DL
HCT VFR BLD AUTO: 32.7 % (ref 35.8–46.3)
HGB BLD-MCNC: 10.6 G/DL (ref 11.7–15.4)
HGB UR QL STRIP: ABNORMAL
IMM GRANULOCYTES # BLD AUTO: 0 K/UL (ref 0–0.5)
IMM GRANULOCYTES NFR BLD AUTO: 0 % (ref 0–5)
KETONES UR QL STRIP.AUTO: ABNORMAL MG/DL
LEUKOCYTE ESTERASE UR QL STRIP.AUTO: NEGATIVE
LYMPHOCYTES # BLD: 0.4 K/UL (ref 0.5–4.6)
LYMPHOCYTES NFR BLD: 7 % (ref 13–44)
Lab: NORMAL
Lab: NORMAL
MCH RBC QN AUTO: 29 PG (ref 26.1–32.9)
MCHC RBC AUTO-ENTMCNC: 32.4 G/DL (ref 31.4–35)
MCV RBC AUTO: 89.3 FL (ref 82–102)
MONOCYTES # BLD: 0.3 K/UL (ref 0.1–1.3)
MONOCYTES NFR BLD: 4 % (ref 4–12)
MUCOUS THREADS URNS QL MICRO: 0 /LPF
NEUTS SEG # BLD: 5.4 K/UL (ref 1.7–8.2)
NEUTS SEG NFR BLD: 89 % (ref 43–78)
NITRITE UR QL STRIP.AUTO: NEGATIVE
NRBC # BLD: 0 K/UL (ref 0–0.2)
OTHER OBSERVATIONS: NORMAL
PH STL: 7 [PH] (ref 6–8)
PH UR STRIP: 5 [PH] (ref 5–9)
PLATELET # BLD AUTO: 136 K/UL (ref 150–450)
PMV BLD AUTO: 11.3 FL (ref 9.4–12.3)
POTASSIUM SERPL-SCNC: 3.1 MMOL/L (ref 3.5–5.1)
PROT SERPL-MCNC: 6.4 G/DL (ref 6.3–8.2)
PROT UR STRIP-MCNC: 30 MG/DL
RBC # BLD AUTO: 3.66 M/UL (ref 4.05–5.2)
RBC #/AREA URNS HPF: 0 /HPF
REFERENCE LAB: NORMAL
SODIUM SERPL-SCNC: 135 MMOL/L (ref 133–143)
SP GR UR REFRACTOMETRY: 1.02 (ref 1–1.02)
UROBILINOGEN UR QL STRIP.AUTO: 0.2 EU/DL (ref 0.2–1)
VIT B12 SERPL-MCNC: 362 PG/ML (ref 193–986)
WBC # BLD AUTO: 6.1 K/UL (ref 4.3–11.1)
WBC URNS QL MICRO: NORMAL /HPF

## 2022-11-23 PROCEDURE — 82607 VITAMIN B-12: CPT

## 2022-11-23 PROCEDURE — 82140 ASSAY OF AMMONIA: CPT

## 2022-11-23 PROCEDURE — 87427 SHIGA-LIKE TOXIN AG IA: CPT

## 2022-11-23 PROCEDURE — 6370000000 HC RX 637 (ALT 250 FOR IP): Performed by: HOSPITALIST

## 2022-11-23 PROCEDURE — 84999 UNLISTED CHEMISTRY PROCEDURE: CPT

## 2022-11-23 PROCEDURE — 2580000003 HC RX 258: Performed by: HOSPITALIST

## 2022-11-23 PROCEDURE — 97530 THERAPEUTIC ACTIVITIES: CPT

## 2022-11-23 PROCEDURE — 6360000002 HC RX W HCPCS: Performed by: STUDENT IN AN ORGANIZED HEALTH CARE EDUCATION/TRAINING PROGRAM

## 2022-11-23 PROCEDURE — 85025 COMPLETE CBC W/AUTO DIFF WBC: CPT

## 2022-11-23 PROCEDURE — 97165 OT EVAL LOW COMPLEX 30 MIN: CPT

## 2022-11-23 PROCEDURE — 82746 ASSAY OF FOLIC ACID SERUM: CPT

## 2022-11-23 PROCEDURE — 97535 SELF CARE MNGMENT TRAINING: CPT

## 2022-11-23 PROCEDURE — 2580000003 HC RX 258: Performed by: STUDENT IN AN ORGANIZED HEALTH CARE EDUCATION/TRAINING PROGRAM

## 2022-11-23 PROCEDURE — 80053 COMPREHEN METABOLIC PANEL: CPT

## 2022-11-23 PROCEDURE — 6370000000 HC RX 637 (ALT 250 FOR IP): Performed by: STUDENT IN AN ORGANIZED HEALTH CARE EDUCATION/TRAINING PROGRAM

## 2022-11-23 PROCEDURE — 1100000000 HC RM PRIVATE

## 2022-11-23 PROCEDURE — 83631 LACTOFERRIN FECAL (QUANT): CPT

## 2022-11-23 PROCEDURE — 83986 ASSAY PH BODY FLUID NOS: CPT

## 2022-11-23 PROCEDURE — 36415 COLL VENOUS BLD VENIPUNCTURE: CPT

## 2022-11-23 PROCEDURE — 89055 LEUKOCYTE ASSESSMENT FECAL: CPT

## 2022-11-23 RX ORDER — GABAPENTIN 100 MG/1
200 CAPSULE ORAL 2 TIMES DAILY
Status: DISCONTINUED | OUTPATIENT
Start: 2022-11-23 | End: 2022-11-30 | Stop reason: HOSPADM

## 2022-11-23 RX ORDER — OSELTAMIVIR PHOSPHATE 30 MG/1
30 CAPSULE ORAL 2 TIMES DAILY
Status: COMPLETED | OUTPATIENT
Start: 2022-11-23 | End: 2022-11-27

## 2022-11-23 RX ORDER — SODIUM CHLORIDE 9 MG/ML
INJECTION, SOLUTION INTRAVENOUS CONTINUOUS
Status: ACTIVE | OUTPATIENT
Start: 2022-11-23 | End: 2022-11-24

## 2022-11-23 RX ORDER — ATORVASTATIN CALCIUM 10 MG/1
20 TABLET, FILM COATED ORAL NIGHTLY
Status: DISCONTINUED | OUTPATIENT
Start: 2022-11-23 | End: 2022-11-30 | Stop reason: HOSPADM

## 2022-11-23 RX ADMIN — GABAPENTIN 200 MG: 400 CAPSULE ORAL at 08:33

## 2022-11-23 RX ADMIN — MONTELUKAST 10 MG: 10 TABLET, FILM COATED ORAL at 08:38

## 2022-11-23 RX ADMIN — SODIUM CHLORIDE: 9 INJECTION, SOLUTION INTRAVENOUS at 21:20

## 2022-11-23 RX ADMIN — SODIUM CHLORIDE: 9 INJECTION, SOLUTION INTRAVENOUS at 05:19

## 2022-11-23 RX ADMIN — POTASSIUM CHLORIDE 10 MEQ: 750 TABLET, EXTENDED RELEASE ORAL at 08:33

## 2022-11-23 RX ADMIN — Medication 1 TABLET: at 08:33

## 2022-11-23 RX ADMIN — ASPIRIN 81 MG: 81 TABLET ORAL at 08:32

## 2022-11-23 RX ADMIN — ESTRADIOL 1 MG: 1 TABLET ORAL at 08:33

## 2022-11-23 RX ADMIN — TROSPIUM CHLORIDE 20 MG: 20 TABLET, FILM COATED ORAL at 05:38

## 2022-11-23 RX ADMIN — ONDANSETRON 4 MG: 2 INJECTION INTRAMUSCULAR; INTRAVENOUS at 21:25

## 2022-11-23 RX ADMIN — LOSARTAN POTASSIUM 100 MG: 50 TABLET, FILM COATED ORAL at 08:38

## 2022-11-23 RX ADMIN — ENOXAPARIN SODIUM 40 MG: 100 INJECTION SUBCUTANEOUS at 21:19

## 2022-11-23 RX ADMIN — LAMOTRIGINE 100 MG: 100 TABLET ORAL at 21:21

## 2022-11-23 RX ADMIN — ATORVASTATIN CALCIUM 20 MG: 10 TABLET, FILM COATED ORAL at 21:21

## 2022-11-23 RX ADMIN — Medication 800 UNITS: at 08:32

## 2022-11-23 RX ADMIN — Medication 800 UNITS: at 21:25

## 2022-11-23 RX ADMIN — FLUTICASONE PROPIONATE 1 SPRAY: 50 SPRAY, METERED NASAL at 08:32

## 2022-11-23 RX ADMIN — CETIRIZINE HYDROCHLORIDE 5 MG: 5 TABLET ORAL at 08:33

## 2022-11-23 RX ADMIN — LAMOTRIGINE 100 MG: 100 TABLET ORAL at 08:38

## 2022-11-23 RX ADMIN — FLUOXETINE HYDROCHLORIDE 20 MG: 10 CAPSULE ORAL at 08:33

## 2022-11-23 RX ADMIN — TROSPIUM CHLORIDE 20 MG: 20 TABLET, FILM COATED ORAL at 15:47

## 2022-11-23 RX ADMIN — SODIUM CHLORIDE, PRESERVATIVE FREE 10 ML: 5 INJECTION INTRAVENOUS at 08:35

## 2022-11-23 RX ADMIN — SODIUM CHLORIDE, PRESERVATIVE FREE 10 ML: 5 INJECTION INTRAVENOUS at 21:20

## 2022-11-23 RX ADMIN — OSELTAMIVIR PHOSPHATE 30 MG: 30 CAPSULE ORAL at 08:38

## 2022-11-23 RX ADMIN — GABAPENTIN 200 MG: 400 CAPSULE ORAL at 21:20

## 2022-11-23 RX ADMIN — ONDANSETRON 4 MG: 4 TABLET, ORALLY DISINTEGRATING ORAL at 13:11

## 2022-11-23 RX ADMIN — OSELTAMIVIR PHOSPHATE 30 MG: 30 CAPSULE ORAL at 21:21

## 2022-11-23 ASSESSMENT — PAIN SCALES - GENERAL
PAINLEVEL_OUTOF10: 0
PAINLEVEL_OUTOF10: 0

## 2022-11-23 NOTE — PROGRESS NOTES
In and out cath done to obtain urine specimen for u/a. Performed using sterile technique. Urine is clear and yellow. Patient tolerated well. Urine specimen sent to lab.

## 2022-11-23 NOTE — PROGRESS NOTES
ACUTE OCCUPATIONAL THERAPY GOALS:   (Developed with and agreed upon by patient and/or caregiver.)  1. Patient will complete lower body bathing and dressing with MOD I and adaptive equipment as needed. 2.Patient will complete upper body bathing and dressing with MOD I and adaptive equipment as needed. 3. Patient will complete toileting with MOD I.   4. Patient will tolerate 30 minutes of OT treatment with 1-2 rest breaks to increase activity tolerance for ADLs. 5. Patient will complete functional transfers with MOD I and adaptive equipment as needed. 6. Patient will complete simple grooming task standing at the sink with MOD I. Timeframe: 7 visits      OCCUPATIONAL THERAPY Initial Assessment and Daily Note       OT Visit Days: 1  Acknowledge Orders  Time  OT Charge Capture  Rehab Caseload Tracker      Edward Pandey is a 80 y.o. female   PRIMARY DIAGNOSIS: Acute encephalopathy  Acute encephalopathy [G93.40]       Reason for Referral: Generalized Muscle Weakness (M62.81)  Other lack of cordination (R27.8)  Difficulty in walking, Not elsewhere classified (R26.2)  Inpatient: Payor: Pat Boo / Plan: MEDICARE PART A AND B / Product Type: *No Product type* /     ASSESSMENT:     REHAB RECOMMENDATIONS:   Recommendation to date pending progress:  Setting:  Short-term Rehab    Equipment:    To Be Determined     ASSESSMENT:  Ms. Carson Crenshaw presented to the hospital with weakness and diarrhea. Pt found to be flu+. At baseline, pt is independent for ADLs and IADLs. Lives with her daughter and son-in-law. Pt currently having to provide care for her daughter due to health issues. Today, pt was received supine in bed. Completed bed mobility, LB dressing, functional transfers, ambulation in room, toileting, and grooming task standing at the sink with overall Andrew. Pt noted to be incontinent of BM--reported this is not normal for her. Pt generally weak and requiring hands on assistance for mobility/bADLs.  Family would like for pt to return to her independent baseline prior to returning home as they can only provide limited assistance--would benefit from STR at discharge. Kranthi Diaz currently demonstrates overall deficits in strength, balance, activity tolerance, and ADL performance. Patient would benefit from skilled OT services at this time in order to address functional deficits and OT goals stated above. [address] [facility] [address] [id number] AM-PAC 6 Clicks Daily Activity Inpatient Short Form:    AM-PAC Daily Activity Inpatient   How much help for putting on and taking off regular lower body clothing?: A Little  How much help for Bathing?: A Little  How much help for Toileting?: A Little  How much help for putting on and taking off regular upper body clothing?: A Little  How much help for taking care of personal grooming?: A Little  How much help for eating meals?: None  AM-PAC Inpatient Daily Activity Raw Score: 19  AM-PAC Inpatient ADL T-Scale Score : 40.22  ADL Inpatient CMS 0-100% Score: 42.8  ADL Inpatient CMS G-Code Modifier : CK      SUBJECTIVE:     Ms. Selma Villareal states, \"I just go every time I stand up. \"     Social/Functional Lives With: Family  Type of Home: House  Independent for ADLs and basic IADLs--assists daughter due to daughter recently having health issues  OBJECTIVE:     Gloria Diane / Antonio Mayo / AIRWAY: NA    RESTRICTIONS/PRECAUTIONS:       PAIN: VITALS / O2:   Pre Treatment:   Pain Assessment: None - Denies Pain      Post Treatment: no change        Vitals          Oxygen            GROSS EVALUATION: INTACT IMPAIRED   (See Comments)   UE AROM [x] []   UE PROM [x] []   Strength []  Functional for bADLs, generalized weakness noted      Posture / Balance []  Fair+ sitting, fair standing   Sensation [x]     Coordination []       Tone [x]       Edema [x]    Activity Tolerance []  Generalized weakness--fatigues with minimal acitivity     Hand Dominance R [] L []      COGNITION/  PERCEPTION: INTACT IMPAIRED   (See Comments)   Orientation [x]     Vision [x]     Hearing [x]     Cognition  []  Decreased safety awareness    Perception [x]       MOBILITY: I Mod I S SBA CGA Min Mod Max Total  NT x2 Comments:   Bed Mobility    Rolling [] [] [] [] [] [] [] [] [] [x] []    Supine to Sit [] [] [] [] [] x [] [] [] [] []    Scooting [] [] [] [] [] [x][] [] [] [] [] []    Sit to Supine [] [] [] [] [] [x] [] [] [] [] []    Transfers    Sit to Stand [] [] [] [] [] [x] [] [] [] [] []    Bed to Chair [] [] [] [] [] [x] [] [] [] [] []    Stand to Sit [] [] [] [] [] [x] [] [] [] [] []    Tub/Shower [] [] [] [] [] [] [] [] [] [x] []     Toilet [] [] [] [] [] [x] [] [] [] [] []      [] [] [] [] [] [] [] [] [] [] []    I=Independent, Mod I=Modified Independent, S=Supervision/Setup, SBA=Standby Assistance, CGA=Contact Guard Assistance, Min=Minimal Assistance, Mod=Moderate Assistance, Max=Maximal Assistance, Total=Total Assistance, NT=Not Tested    ACTIVITIES OF DAILY LIVING: I Mod I S SBA CGA Min Mod Max Total NT Comments   BASIC ADLs:              Upper Body Bathing  [] [] [] [] [] [] [] [] [] [x]    Lower Body Bathing [] [] [] [] [] [] [] [] [] [x]    Toileting [] [] [] [] [] [x] [] [] [] []    Upper Body Dressing [] [] [] [] [] [x] [] [] [] []    Lower Body Dressing [] [] [] [] [] [x] [] [] [] [] Socks and donning brief as underwear    Feeding [] [] [] [] [] [] [] [] [] [x]    Grooming [] [] [] [] [] [x] [] [] [] [] Washed hands standing at the sink   Personal Device Care [] [] [] [] [] [] [] [] [] [x]    Functional Mobility [] [] [] [] [] [x] [] [] [] []    I=Independent, Mod I=Modified Independent, S=Supervision/Setup, SBA=Standby Assistance, CGA=Contact Guard Assistance, Min=Minimal Assistance, Mod=Moderate Assistance, Max=Maximal Assistance, Total=Total Assistance, NT=Not Tested    PLAN:   FREQUENCY/DURATION   OT Plan of Care: 3 times/week for duration of hospital stay or until stated goals are met, whichever comes first.    PROBLEM LIST:   (Skilled intervention is medically necessary to address:)  Decreased ADL/Functional Activities  Decreased Activity Tolerance  Decreased Balance  Decreased Cognition  Decreased Coordination  Decreased Safety Awareness  Decreased Strength  Decreased Transfer Abilities   INTERVENTIONS PLANNED:  (Benefits and precautions of occupational therapy have been discussed with the patient.)  Self Care Training  Therapeutic Activity  Therapeutic Exercise/HEP  Neuromuscular Re-education  Manual Therapy  Education         TREATMENT:     EVALUATION: LOW COMPLEXITY: (Untimed Charge)    TREATMENT:   Therapeutic Activity (10 Minutes): Patient participated in therapeutic activities including bed mobility training, functional transfer training, toilet transfer training, functional mobility of household distances, bathroom mobility, sitting tolerance activity, and standing tolerance activity with minimal verbal cues and tactile cues in order to increase independence, increase safety awareness, and increase activity tolerance. Self Care (10 minutes): Patient participated in toileting, lower body dressing, and grooming ADLs in unsupported sitting and standing with minimal verbal cueing to increase independence, decrease assistance required, increase activity tolerance, and increase safety awareness. Patient also participated in functional mobility and functional transfer training to increase independence, decrease assistance required, increase activity tolerance, and increase safety awareness. TREATMENT GRID:  N/A    AFTER TREATMENT PRECAUTIONS: Alarm Activated, Bed, Call light within reach, Needs within reach, and RN notified    INTERDISCIPLINARY COLLABORATION:  RN/ PCT and OT/ BRAVO    EDUCATION:  Education Given To: Patient  Education Provided: Role of Therapy;Plan of Care; Fall Prevention Strategies  Education Outcome: Verbalized understanding;Demonstrated understanding    TOTAL TREATMENT DURATION AND TIME:  Time In: 1130  Time Out: 1200  Minutes: [facility]

## 2022-11-23 NOTE — PROGRESS NOTES
House  OBJECTIVE:     PAIN: VITALS / O2: PRECAUTION / Abril Riddles / DRAINS:   Pre Treatment:   Pain Assessment: None - Denies Pain      Post Treatment: 0 Vitals        Oxygen    None    RESTRICTIONS/PRECAUTIONS:        MOBILITY: I Mod I S SBA CGA Min Mod Max Total  NT x2 Comments:   Bed Mobility    Rolling [] [] [] [] [] [] [] [] [] [] []    Supine to Sit [] [] [] [] [] [x] [] [] [] [] []    Scooting [] [] [] [] [] [] [] [] [] [] []    Sit to Supine [] [] [] [] [] [x] [] [] [] [] []    Transfers    Sit to Stand [] [] [] [] [] [x] [] [] [] [] []    Bed to Chair [] [] [] [] [] [x] [] [] [] [] []    Stand to Sit [] [] [] [] [] [x] [] [] [] [] []     [] [] [] [] [] [] [] [] [] [] []    I=Independent, Mod I=Modified Independent, S=Supervision, SBA=Standby Assistance, CGA=Contact Guard Assistance,   Min=Minimal Assistance, Mod=Moderate Assistance, Max=Maximal Assistance, Total=Total Assistance, NT=Not Tested    BALANCE: Good Fair+ Fair Fair- Poor NT Comments   Sitting Static [x] [] [] [] [] []    Sitting Dynamic [] [x] [] [] [] []              Standing Static [] [] [x] [] [] []    Standing Dynamic [] [] [] [x] [] []      GAIT: I Mod I S SBA CGA Min Mod Max Total  NT x2 Comments:   Level of Assistance [] [] [] [] [] [x] [] [] [] [] []    Distance   50 feet 8' x 2    DME HHA    Gait Quality Decreased brandyn , Decreased step clearance, and Decreased step length    Weightbearing Status      Stairs      I=Independent, Mod I=Modified Independent, S=Supervision, SBA=Standby Assistance, CGA=Contact Guard Assistance,   Min=Minimal Assistance, Mod=Moderate Assistance, Max=Maximal Assistance, Total=Total Assistance, NT=Not Tested    PLAN:   FREQUENCY AND DURATION: 3 times/week for duration of hospital stay or until stated goals are met, whichever comes first.    TREATMENT:   TREATMENT:   Therapeutic Activity (24 Minutes):  Therapeutic activity included Supine to Sit, Sit to Supine, Transfer Training, Ambulation on level ground, Sitting balance , and Standing balance to improve functional Activity tolerance, Balance, Mobility, and Strength. TREATMENT GRID:  N/A    AFTER TREATMENT PRECAUTIONS: Alarm Activated, Bed, Bed/Chair Locked, Call light within reach, Needs within reach, RN notified, and Side rails x3    INTERDISCIPLINARY COLLABORATION:  RN/ PCT and PT/ PTA    EDUCATION:      TIME IN/OUT:  Time In: 1350  Time Out: [address] 141  Minutes: 100 Tegan Hernandez.  JESSA Diaz

## 2022-11-23 NOTE — PROGRESS NOTES
Hospitalist Progress Note   Admit Date:  2022 11:14 AM   Name:  Quinton Arnold   Age:  80 y.o. Sex:  female  :  1935   MRN:  [id number]   Room:  [facility]/    Presenting Complaint: confusion and weakness  Reason(s) for Admission: Acute encephalopathy [G93.40]     Hospital Course & Interval History:   Patient is a 15-year-old female with history of HTN, dementia, HLD, bipolar disorder admitted on  with acute encephalopathy, generalized weakness and inability to walk. Patient is hard of hearing at baseline. Patient was tested positive for influenza A. Patient has been afebrile since admission, reports of cough, nasal congestion and some sore throat. Subjective/24hr Events (22): Patient seen at bedside, resting in bed comfortably. She is alert and awake, AOx3. She denies any headache, chest pain, palpitation, abdominal pain, nausea or vomiting. Patient noted to be febrile last night, T-max 102.7    ROS:  10 point review of system is negative except for what mentioned above. Assessment & Plan:     Principal Problem:    Acute encephalopathy  Plan: -  Mentation is improving, patient is AOx3. Likely from underlying influenza A infection. Continue to monitor for any changes. Active Problems:    General weakness  Plan: -  PT OT eval  Likely from underlying influenza and age-related debility. Inability to walk  Plan: -  Continue PT OT eval  Patient may benefit from rehab placement. Influenza A  Plan: -  Tamiflu 30 mg p.o. twice daily  Continue Flonase, Singulair p.o. daily. Bipolar 1 disorder ([facility] [address]  Plan: -  Continue Lamictal 20 mg p.o. twice daily  Prozac 20 mg p.o. daily      HTN (hypertension)  Plan: -  BP is optimally controlled with losartan 100 mg po daily      Dementia ([address]  Plan: Noted, conservative treatment. Discharge Planning:    Pending clinical course.   Patient family requesting for patient to go to rehab    I spent 36 minutes of time caring for this patient on the unit nearby or at bedside, and more than 50 percent was spent on coordination of care activities, and/or patient/family counseling regarding status and plan of care. Diet:  ADULT DIET; Regular; Low Fat/Low Chol/High Fiber/DAMIAN; Low Sodium (2 gm)  DVT PPx: Lovenox  Code status: Full Code    Hospital Problems             Last Modified POA    * (Principal) Acute encephalopathy 11/22/2022 Yes    General weakness 11/23/2022 Yes    Inability to walk 11/23/2022 Yes    Influenza A 11/23/2022 Yes    Bipolar 1 disorder ([facility] [address] 11/23/2022 Yes    Overview Signed 3/19/2022  8:35 PM by Harry, Convprob     managed with medication            HTN (hypertension) 11/23/2022 Yes    Dementia ([facility] 11/23/2022 Yes       Objective:   Patient Vitals for the past 24 hrs:   Temp Pulse Resp BP SpO2   11/23/22 0425 (!) 102.7 °F (39.3 °C) 88 18 (!) 136/55 97 %   11/22/22 1945 99.7 °F (37.6 °C) 58 18 106/60 94 %   11/22/22 1800 98.2 °F (36.8 °C) -- -- -- --   11/22/22 1626 (!) 100.6 °F (38.1 °C) 65 18 (!) 125/54 91 %   11/22/22 1125 98.8 °F (37.1 °C) 65 17 (!) 112/59 95 %       Estimated body mass index is 23.98 kg/m² as calculated from the following:    Height as of this encounter: 5' 2\" (1.575 m). Weight as of this encounter: 131 lb 1.6 oz (59.5 kg). Intake/Output Summary (Last 24 hours) at 11/23/2022 0749  Last data filed at 11/23/2022 0253  Gross per 24 hour   Intake 240 ml   Output 600 ml   Net -360 ml         Physical Exam:     Blood pressure (!) 136/55, pulse 88, temperature (!) 102.7 °F (39.3 °C), resp. rate 18, height 5' 2\" (1.575 m), weight 131 lb 1.6 oz (59.5 kg), SpO2 97 %. General:    Frail, elderly, alert and awake, NAD  Head:  Normocephalic, atraumatic  Eyes:  Sclerae appear normal. Pupils equally round. ENT:  Nares appear normal, no drainage. Moist oral mucosa  Neck:  No restricted ROM. Trachea midline. CV:   RRR. No m/r/g. No jugular venous distension.   Lungs: SARS-CoV-2, Rapid Not detected NOTD     Respiratory Panel, Molecular, with COVID-19 (Restricted: peds pts or suitable admitted adults)    Collection Time: 11/22/22  2:44 PM    Specimen: Nasopharyngeal   Result Value Ref Range    Adenovirus by PCR NOT DETECTED NOTDET      Coronavirus 229E by PCR NOT DETECTED NOTDET      Coronavirus HKU1 by PCR NOT DETECTED NOTDET      Coronavirus NL63 by PCR NOT DETECTED NOTDET      Coronavirus OC43 by PCR NOT DETECTED NOTDET      SARS-CoV-2, PCR NOT DETECTED NOTDET      Human Metapneumovirus by PCR NOT DETECTED NOTDET      Rhinovirus Enterovirus PCR NOT DETECTED NOTDET      Influenza A H3 by PCR Detected (A) NOTDET      Influenza B PCR NOT DETECTED NOTDET      Parainfluenza 1 PCR NOT DETECTED NOTDET      Parainfluenza 2 PCR NOT DETECTED NOTDET      Parainfluenza 3 PCR NOT DETECTED NOTDET      Parainfluenza 4 PCR NOT DETECTED NOTDET      Respiratory Syncytial Virus by PCR NOT DETECTED NOTDET      Bordetella parapertussis by PCR NOT DETECTED NOTDET      Bordetella pertussis by PCR NOT DETECTED NOTDET      Chlamydophila Pneumonia PCR NOT DETECTED NOTDET      Mycoplasma pneumo by PCR NOT DETECTED NOTDET     Urinalysis w rflx microscopic    Collection Time: 11/22/22 11:00 PM   Result Value Ref Range    Color, UA YELLOW/STRAW      Appearance CLEAR      Specific Gravity, UA 1.020 1.001 - 1.023      pH, Urine 5.0 5.0 - 9.0      Protein, UA 30 (A) NEG mg/dL    Glucose, UA Negative mg/dL    Ketones, Urine TRACE (A) NEG mg/dL    Bilirubin Urine Negative NEG      Blood, Urine MODERATE (A) NEG      Urobilinogen, Urine 0.2 0.2 - 1.0 EU/dL    Nitrite, Urine Negative NEG      Leukocyte Esterase, Urine Negative NEG     Urinalysis, Micro    Collection Time: 11/22/22 11:00 PM   Result Value Ref Range    WBC, UA 0-3 0 /hpf    RBC, UA 0 0 /hpf    Epithelial Cells UA 0-3 0 /hpf    BACTERIA, URINE TRACE 0 /hpf    Casts 0 0 /lpf    Crystals 0 0 /LPF    Mucus, UA 0 0 /lpf    Other observations RESULTS VERIFIED MANUALLY     pH, Stool    Collection Time: 11/23/22  2:47 AM   Result Value Ref Range    Fecal pH 7.0 6.0 - 8.0     Comprehensive Metabolic Panel w/ Reflex to MG    Collection Time: 11/23/22  4:13 AM   Result Value Ref Range    Sodium 135 133 - 143 mmol/L    Potassium 3.1 (L) 3.5 - 5.1 mmol/L    Chloride 105 101 - 110 mmol/L    CO2 21 21 - 32 mmol/L    Anion Gap 9 2 - 11 mmol/L    Glucose 104 (H) 65 - 100 mg/dL    BUN 18 8 - 23 MG/DL    Creatinine 0.90 0.6 - 1.0 MG/DL    Est, Glom Filt Rate >60 >60 ml/min/1.73m2    Calcium 8.2 (L) 8.3 - 10.4 MG/DL    Total Bilirubin 0.4 0.2 - 1.1 MG/DL    ALT 23 12 - 65 U/L    AST 27 15 - 37 U/L    Alk Phosphatase 56 50 - 136 U/L    Total Protein 6.4 6.3 - 8.2 g/dL    Albumin 3.2 3.2 - 4.6 g/dL    Globulin 3.2 2.8 - 4.5 g/dL    Albumin/Globulin Ratio 1.0 0.4 - 1.6     CBC with Auto Differential    Collection Time: 11/23/22  4:13 AM   Result Value Ref Range    WBC 6.1 4.3 - 11.1 K/uL    RBC 3.66 (L) 4.05 - 5.2 M/uL    Hemoglobin 10.6 (L) 11.7 - 15.4 g/dL    Hematocrit 32.7 (L) 35.8 - 46.3 %    MCV 89.3 82 - 102 FL    MCH 29.0 26.1 - 32.9 PG    MCHC 32.4 31.4 - 35.0 g/dL    RDW 14.0 11.9 - 14.6 %    Platelets 675 (L) 813 - 450 K/uL    MPV 11.3 9.4 - 12.3 FL    nRBC 0.00 0.0 - 0.2 K/uL    Differential Type AUTOMATED      Seg Neutrophils 89 (H) 43 - 78 %    Lymphocytes 7 (L) 13 - 44 %    Monocytes 4 4.0 - 12.0 %    Eosinophils % 0 (L) 0.5 - 7.8 %    Basophils 0 0.0 - 2.0 %    Immature Granulocytes 0 0.0 - 5.0 %    Segs Absolute 5.4 1.7 - 8.2 K/UL    Absolute Lymph # 0.4 (L) 0.5 - 4.6 K/UL    Absolute Mono # 0.3 0.1 - 1.3 K/UL    Absolute Eos # 0.0 0.0 - 0.8 K/UL    Basophils Absolute 0.0 0.0 - 0.2 K/UL    Absolute Immature Granulocyte 0.0 0.0 - 0.5 K/UL   Ammonia    Collection Time: 11/23/22  4:13 AM   Result Value Ref Range    Ammonia 19 11 - 32 UMOL/L   Folate    Collection Time: 11/23/22  4:13 AM   Result Value Ref Range    Folate 17.0 3.1 - 17.5 ng/mL   Vitamin B12 Collection Time: 11/23/22  4:13 AM   Result Value Ref Range    Vitamin B-12 362 193 - 986 pg/mL         Other Studies:  XR CHEST (2 VW)    Result Date: 11/22/2022  EXAM: Chest x-ray. INDICATION: Cough and fever. COMPARISON: None. TECHNIQUE: Frontal and lateral view chest x-ray. FINDINGS: The lungs are clear. The cardiac size, mediastinal contour and pulmonary vasculature are normal. No pneumothorax or pleural effusion is seen. There are degenerative changes in the spine and atherosclerotic calcifications in the aortic arch. No acute process. CT HEAD WO CONTRAST    Result Date: 11/22/2022  History: Hypertension, dementia Exam: CT head without contrast Technique: Thin section axial CT images were obtained from the skullbase through the vertex. Radiation dose reduction techniques were used for this study. Our CT scanners use one or all of the following: Automated exposure control, adjustment of the mA and/or kV according to patient size, use of iterative reconstruction. Findings: There is generalized cerebral atrophy with chronic appearing white matter change in the corona radiata and centrum semiovale. The ventricles are normal in size, shape, and position. No extra-axial fluid collection is present. There is no mass or mass-effect. The basilar cisterns are patent. The paranasal sinuses and mastoid air cells are clear. Chronic appearing white matter change without acute intracranial abnormality. MRI BRAIN WO CONTRAST    Result Date: 11/23/2022  MRI BRAIN WITHOUT CONTRAST 11/22/2022 HISTORY: Gait instability. Encephalopathy. TECHNIQUE: Sagittal and axial T1-weighted, axial T2-weighted, axial and coronal FLAIR, axial T2-weighted gradient-echo, axial diffusion weighted images with ADC maps of the brain. COMPARISON: Head CT 11/22/2022 FINDINGS: There is no acute infarction, acute intracranial hemorrhage, intra-axial mass, hydrocephalus, or extra-axial hematoma.  The cerebellar tonsils are in a normal position. On the T2-weighted and FLAIR sequences, there are multiple white matter hyperintensities throughout the periventricular brain. This is not an uncommon finding which may be present with asymptomatic patients, with migraine headaches or with mild chronic small vessel ischemic disease. There is an old lacunar infarct in the left corona radiata anteriorly. Mild cerebral volume loss is present without disproportionate hippocampal atrophy. A right trace mastoid effusion is present. The remaining paranasal sinuses are clear where visible. 1.  No acute infarction. 2.  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 3.  Right mastoid effusion. This finding may be present with mastoiditis in the appropriate clinical setting.       Current Meds:  Current Facility-Administered Medications   Medication Dose Route Frequency    oseltamivir (TAMIFLU) capsule 30 mg  30 mg Oral BID    atorvastatin (LIPITOR) tablet 20 mg  20 mg Oral Nightly    sodium chloride flush 0.9 % injection 5-40 mL  5-40 mL IntraVENous 2 times per day    sodium chloride flush 0.9 % injection 5-40 mL  5-40 mL IntraVENous PRN    0.9 % sodium chloride infusion   IntraVENous PRN    enoxaparin (LOVENOX) injection 40 mg  40 mg SubCUTAneous Q24H    ondansetron (ZOFRAN-ODT) disintegrating tablet 4 mg  4 mg Oral Q8H PRN    Or    ondansetron (ZOFRAN) injection 4 mg  4 mg IntraVENous Q6H PRN    polyethylene glycol (GLYCOLAX) packet 17 g  17 g Oral Daily PRN    acetaminophen (TYLENOL) tablet 650 mg  650 mg Oral Q6H PRN    Or    acetaminophen (TYLENOL) suppository 650 mg  650 mg Rectal Q6H PRN    benzocaine-menthol (CEPACOL SORE THROAT) lozenge 1 lozenge  1 lozenge Oral Q2H PRN    0.9 % sodium chloride infusion   IntraVENous Continuous    aspirin EC tablet 81 mg  81 mg Oral Daily    estradiol (ESTRACE) tablet 1 mg  1 mg Oral Daily    fluticasone (FLONASE) 50 MCG/ACT nasal spray 1 spray  1 spray Each Nostril Daily    gabapentin (NEURONTIN) capsule 400 mg  400 mg Oral BID    lamoTRIgine (LAMICTAL) tablet 100 mg  100 mg Oral BID    cetirizine (ZYRTEC) tablet 5 mg  5 mg Oral Daily    montelukast (SINGULAIR) tablet 10 mg  10 mg Oral Daily    vitamin E capsule 800 Units  800 Units Oral BID    oyster shell calcium w/D 500-200 MG-UNIT tablet 1 tablet  1 tablet Oral Daily    FLUoxetine (PROZAC) capsule 20 mg  20 mg Oral Daily    losartan (COZAAR) tablet 100 mg  100 mg Oral Daily    And    [Held by provider] hydroCHLOROthiazide (HYDRODIURIL) tablet 12.5 mg  12.5 mg Oral Daily    potassium chloride (KLOR-CON M) extended release tablet 10 mEq  10 mEq Oral Daily with breakfast    trospium (SANCTURA) tablet 20 mg  20 mg Oral BID AC       Signed:  Leisa Peguero MD    Part of this note may have been written by using a voice dictation software. The note has been proof read but may still contain some grammatical/other typographical errors.

## 2022-11-23 NOTE — PROGRESS NOTES
Physician Progress Note      PATIENTKhloe Whalen  CSN #:                  [id number]  :                       1935  ADMIT DATE:       2022 11:14 AM  DISCH DATE:  RESPONDING  PROVIDER #:        Mamadou Barcenas MD          QUERY TEXT:    Pt admitted with confusion and fever and has encephalopathy documented. If   possible, please document in progress notes and discharge summary further   specificity regarding the type of encephalopathy:    The medical record reflects the following:  Risk Factors: HTN, Dementia, HLD and Bipolar disorder who presents via EMS   with weakness and diarrhea  Clinical Indicators: H&P states  Acute encephalopathy  Baseline AAOx3 and currently AO x3  Treatment: UA, MRI, ammonia/B12/folic acid levels, neurology consult  Options provided:  -- Metabolic encephalopathy  -- Septic encephalopathy  -- Encephalopathy due to dementia  -- Other - I will add my own diagnosis  -- Disagree - Not applicable / Not valid  -- Disagree - Clinically unable to determine / Unknown  -- Refer to Clinical Documentation Reviewer    PROVIDER RESPONSE TEXT:    This patient has metabolic encephalopathy.     Query created by: Jaycee Becker on 2022 3:43 PM      Electronically signed by:  Mamadou Barcenas MD 2022 3:47 PM

## 2022-11-24 LAB
C DIFF GDH STL QL: NORMAL
C DIFF TOX A+B STL QL IA: NORMAL
C DIFF TOXIN INTERPRETATION: NORMAL
CLINICAL CONSIDERATION: NORMAL
REFLEX: NORMAL

## 2022-11-24 PROCEDURE — 6370000000 HC RX 637 (ALT 250 FOR IP): Performed by: STUDENT IN AN ORGANIZED HEALTH CARE EDUCATION/TRAINING PROGRAM

## 2022-11-24 PROCEDURE — 6370000000 HC RX 637 (ALT 250 FOR IP): Performed by: HOSPITALIST

## 2022-11-24 PROCEDURE — 2580000003 HC RX 258: Performed by: HOSPITALIST

## 2022-11-24 PROCEDURE — 2580000003 HC RX 258: Performed by: STUDENT IN AN ORGANIZED HEALTH CARE EDUCATION/TRAINING PROGRAM

## 2022-11-24 PROCEDURE — 87449 NOS EACH ORGANISM AG IA: CPT

## 2022-11-24 PROCEDURE — 6360000002 HC RX W HCPCS: Performed by: STUDENT IN AN ORGANIZED HEALTH CARE EDUCATION/TRAINING PROGRAM

## 2022-11-24 PROCEDURE — 1100000000 HC RM PRIVATE

## 2022-11-24 RX ORDER — LOSARTAN POTASSIUM 50 MG/1
50 TABLET ORAL DAILY
Status: DISCONTINUED | OUTPATIENT
Start: 2022-11-24 | End: 2022-11-26

## 2022-11-24 RX ORDER — GUAIFENESIN 600 MG/1
600 TABLET, EXTENDED RELEASE ORAL 2 TIMES DAILY
Status: DISCONTINUED | OUTPATIENT
Start: 2022-11-24 | End: 2022-11-24 | Stop reason: SDUPTHER

## 2022-11-24 RX ORDER — GUAIFENESIN/DEXTROMETHORPHAN 100-10MG/5
5 SYRUP ORAL 3 TIMES DAILY
Status: DISCONTINUED | OUTPATIENT
Start: 2022-11-24 | End: 2022-11-30 | Stop reason: HOSPADM

## 2022-11-24 RX ADMIN — LAMOTRIGINE 100 MG: 100 TABLET ORAL at 21:06

## 2022-11-24 RX ADMIN — ENOXAPARIN SODIUM 40 MG: 100 INJECTION SUBCUTANEOUS at 21:06

## 2022-11-24 RX ADMIN — FLUTICASONE PROPIONATE 1 SPRAY: 50 SPRAY, METERED NASAL at 09:01

## 2022-11-24 RX ADMIN — GUAIFENESIN SYRUP AND DEXTROMETHORPHAN 5 ML: 100; 10 SYRUP ORAL at 21:06

## 2022-11-24 RX ADMIN — SODIUM CHLORIDE: 9 INJECTION, SOLUTION INTRAVENOUS at 21:11

## 2022-11-24 RX ADMIN — ATORVASTATIN CALCIUM 20 MG: 10 TABLET, FILM COATED ORAL at 21:06

## 2022-11-24 RX ADMIN — TROSPIUM CHLORIDE 20 MG: 20 TABLET, FILM COATED ORAL at 15:28

## 2022-11-24 RX ADMIN — Medication 800 UNITS: at 09:00

## 2022-11-24 RX ADMIN — GABAPENTIN 200 MG: 400 CAPSULE ORAL at 21:06

## 2022-11-24 RX ADMIN — SODIUM CHLORIDE: 9 INJECTION, SOLUTION INTRAVENOUS at 06:01

## 2022-11-24 RX ADMIN — GABAPENTIN 200 MG: 400 CAPSULE ORAL at 09:00

## 2022-11-24 RX ADMIN — FLUOXETINE HYDROCHLORIDE 20 MG: 10 CAPSULE ORAL at 09:00

## 2022-11-24 RX ADMIN — TROSPIUM CHLORIDE 20 MG: 20 TABLET, FILM COATED ORAL at 06:02

## 2022-11-24 RX ADMIN — ASPIRIN 81 MG: 81 TABLET ORAL at 09:00

## 2022-11-24 RX ADMIN — POTASSIUM CHLORIDE 10 MEQ: 750 TABLET, EXTENDED RELEASE ORAL at 09:00

## 2022-11-24 RX ADMIN — SODIUM CHLORIDE, PRESERVATIVE FREE 10 ML: 5 INJECTION INTRAVENOUS at 09:00

## 2022-11-24 RX ADMIN — OSELTAMIVIR PHOSPHATE 30 MG: 30 CAPSULE ORAL at 09:00

## 2022-11-24 RX ADMIN — LAMOTRIGINE 100 MG: 100 TABLET ORAL at 09:00

## 2022-11-24 RX ADMIN — MONTELUKAST 10 MG: 10 TABLET, FILM COATED ORAL at 09:00

## 2022-11-24 RX ADMIN — SODIUM CHLORIDE, PRESERVATIVE FREE 10 ML: 5 INJECTION INTRAVENOUS at 21:06

## 2022-11-24 RX ADMIN — OSELTAMIVIR PHOSPHATE 30 MG: 30 CAPSULE ORAL at 21:06

## 2022-11-24 RX ADMIN — ESTRADIOL 1 MG: 1 TABLET ORAL at 09:00

## 2022-11-24 RX ADMIN — Medication 1 TABLET: at 09:00

## 2022-11-24 RX ADMIN — CETIRIZINE HYDROCHLORIDE 5 MG: 5 TABLET ORAL at 09:00

## 2022-11-24 RX ADMIN — GUAIFENESIN SYRUP AND DEXTROMETHORPHAN 5 ML: 100; 10 SYRUP ORAL at 15:28

## 2022-11-24 RX ADMIN — Medication 800 UNITS: at 21:08

## 2022-11-24 ASSESSMENT — PAIN SCALES - GENERAL
PAINLEVEL_OUTOF10: 0
PAINLEVEL_OUTOF10: 0

## 2022-11-24 NOTE — PROGRESS NOTES
Pt resting in bed comfortably at this time, alert and oriented times 4. Pt is hard of hearing. Pt has had 2 BM's overall today. No distress noted, respirations even and unlabored on room air. Pt denies pain at this time. Pt instructed to call for assistance if needed, call light in place, will continue to monitor.

## 2022-11-24 NOTE — PROGRESS NOTES
Report received from Jhoan Escudero RN. No distress. Pt on droplet precautions at present. Call light within reach. Will monitor. Instructed pt to call should needs arise. PT voiced understanding.

## 2022-11-24 NOTE — PROGRESS NOTES
Pt resting in bed comfortably at this time, alert and oriented times 4. Sometimes slow to respond. No distress noted, respirations even and unlabored on room air. Pt is on droplet for the flu and enteric for c diff rule out. Pt denies pain at this time. NS infusing at 75. Pt instructed to call for assistance if needed, call light in place, will continue to monitor.

## 2022-11-24 NOTE — PROGRESS NOTES
Hospitalist Progress Note   Admit Date:  2022 11:14 AM   Name:  Nicolas Howard   Age:  80 y.o. Sex:  female  :  1935   MRN:  [id number]   Room:  [facility]    Presenting Complaint: confusion and weakness  Reason(s) for Admission: Acute encephalopathy [G93.40]     Hospital Course & Interval History:   Patient is a 80-year-old female with history of HTN, dementia, HLD, bipolar disorder admitted on  with acute encephalopathy, generalized weakness and inability to walk. Patient is hard of hearing at baseline. Patient was tested positive for influenza A. Patient has been afebrile since admission, reports of cough, nasal congestion and some sore throat. Subjective/24hr Events (22): Patient seen at bedside, resting in bed comfortably  Patient is alert and awake, AOx3   Patient reports not feeling okay, complains of having more productive cough, nausea and generalized weakness. Patient denies any fever, chest pain, palpitations, vomiting, diarrhea, urinary symptoms. Patient continues to be on room air. ROS:  10 point review of system is negative except for what mentioned above. Assessment & Plan:     Principal Problem:    Acute encephalopathy  Plan: -  Mentation is improving, patient is AOx3. Likely from underlying influenza A infection. Continue to monitor for any changes. Active Problems:    General weakness  Plan: -  PT OT eval  Likely from underlying influenza and age-related debility. Inability to walk  Plan: -  Continue PT OT eval  Patient may benefit from rehab placement. Influenza A  Plan: -  Tamiflu 30 mg p.o. twice daily  Continue Flonase, Singulair p.o. daily. Added Mucinex 600 mg p.o. twice daily along with guaifenesin for cough.       Bipolar 1 disorder ([facility] [address]  Plan: -  Continue Lamictal 20 mg p.o. twice daily  Prozac 20 mg p.o. daily      HTN (hypertension)  Plan: -  BP is optimally controlled with losartan 100 mg po elderly, alert and awake, NAD  Head:  Normocephalic, atraumatic  Eyes:  Sclerae appear normal. Pupils equally round. ENT:  Nares appear normal, no drainage. Moist oral mucosa  Neck:  No restricted ROM. Trachea midline. CV:   RRR. No m/r/g. No jugular venous distension. Lungs:   CTAB. No wheezing, rhonchi, or rales. Respirations even, unlabored. Abdomen: Bowel sounds present. Soft, nontender, nondistended. Extremities: No cyanosis or clubbing. No edema. Skin:     No rashes and normal coloration. Warm and dry.     Neuro:  GCS 15, cranial nerves intact, no motor or sensory deficit, moving all 4 extremities spontaneously  Psych:  AOx3, flat affect    I have reviewed ordered lab tests and independently visualized imaging below:    Recent Labs:  Recent Results (from the past 48 hour(s))   Respiratory Panel, Molecular, with COVID-19 (Restricted: peds pts or suitable admitted adults)    Collection Time: 11/22/22  2:44 PM    Specimen: Nasopharyngeal   Result Value Ref Range    Adenovirus by PCR NOT DETECTED NOTDET      Coronavirus 229E by PCR NOT DETECTED NOTDET      Coronavirus HKU1 by PCR NOT DETECTED NOTDET      Coronavirus NL63 by PCR NOT DETECTED NOTDET      Coronavirus OC43 by PCR NOT DETECTED NOTDET      SARS-CoV-2, PCR NOT DETECTED NOTDET      Human Metapneumovirus by PCR NOT DETECTED NOTDET      Rhinovirus Enterovirus PCR NOT DETECTED NOTDET      Influenza A H3 by PCR Detected (A) NOTDET      Influenza B PCR NOT DETECTED NOTDET      Parainfluenza 1 PCR NOT DETECTED NOTDET      Parainfluenza 2 PCR NOT DETECTED NOTDET      Parainfluenza 3 PCR NOT DETECTED NOTDET      Parainfluenza 4 PCR NOT DETECTED NOTDET      Respiratory Syncytial Virus by PCR NOT DETECTED NOTDET      Bordetella parapertussis by PCR NOT DETECTED NOTDET      Bordetella pertussis by PCR NOT DETECTED NOTDET      Chlamydophila Pneumonia PCR NOT DETECTED NOTDET      Mycoplasma pneumo by PCR NOT DETECTED NOTDET     Urinalysis w rflx microscopic Collection Time: 11/22/22 11:00 PM   Result Value Ref Range    Color, UA YELLOW/STRAW      Appearance CLEAR      Specific Gravity, UA 1.020 1.001 - 1.023      pH, Urine 5.0 5.0 - 9.0      Protein, UA 30 (A) NEG mg/dL    Glucose, UA Negative mg/dL    Ketones, Urine TRACE (A) NEG mg/dL    Bilirubin Urine Negative NEG      Blood, Urine MODERATE (A) NEG      Urobilinogen, Urine 0.2 0.2 - 1.0 EU/dL    Nitrite, Urine Negative NEG      Leukocyte Esterase, Urine Negative NEG     Urinalysis, Micro    Collection Time: 11/22/22 11:00 PM   Result Value Ref Range    WBC, UA 0-3 0 /hpf    RBC, UA 0 0 /hpf    Epithelial Cells UA 0-3 0 /hpf    BACTERIA, URINE TRACE 0 /hpf    Casts 0 0 /lpf    Crystals 0 0 /LPF    Mucus, UA 0 0 /lpf    Other observations RESULTS VERIFIED MANUALLY     pH, Stool    Collection Time: 11/23/22  2:47 AM   Result Value Ref Range    Fecal pH 7.0 6.0 - 8.0     Culture, Stool    Collection Time: 11/23/22  2:47 AM    Specimen: Stool   Result Value Ref Range    Special Requests NO SPECIAL REQUESTS      Culture        No growth after short period of incubation. Further results to follow after overnight incubation.    Miscellaneous Sendout    Collection Time: 11/23/22  2:47 AM   Result Value Ref Range    Test Description: TEST [id number] FOR STOOL, WBC     Reference Lab [facility]      Results: [facility]     Comprehensive Metabolic Panel w/ Reflex to MG    Collection Time: 11/23/22  4:13 AM   Result Value Ref Range    Sodium 135 133 - 143 mmol/L    Potassium 3.1 (L) 3.5 - 5.1 mmol/L    Chloride 105 101 - 110 mmol/L    CO2 21 21 - 32 mmol/L    Anion Gap 9 2 - 11 mmol/L    Glucose 104 (H) 65 - 100 mg/dL    BUN 18 8 - 23 MG/DL    Creatinine 0.90 0.6 - 1.0 MG/DL    Est, Glom Filt Rate >60 >60 ml/min/1.73m2    Calcium 8.2 (L) 8.3 - 10.4 MG/DL    Total Bilirubin 0.4 0.2 - 1.1 MG/DL    ALT 23 12 - 65 U/L    AST 27 15 - 37 U/L    Alk Phosphatase 56 50 - 136 U/L    Total Protein 6.4 6.3 - 8.2 g/dL    Albumin 3.2 3.2 - 4.6 g/dL Globulin 3.2 2.8 - 4.5 g/dL    Albumin/Globulin Ratio 1.0 0.4 - 1.6     CBC with Auto Differential    Collection Time: 11/23/22  4:13 AM   Result Value Ref Range    WBC 6.1 4.3 - 11.1 K/uL    RBC 3.66 (L) 4.05 - 5.2 M/uL    Hemoglobin 10.6 (L) 11.7 - 15.4 g/dL    Hematocrit 32.7 (L) 35.8 - 46.3 %    MCV 89.3 82 - 102 FL    MCH 29.0 26.1 - 32.9 PG    MCHC 32.4 31.4 - 35.0 g/dL    RDW 14.0 11.9 - 14.6 %    Platelets 377 (L) 417 - 450 K/uL    MPV 11.3 9.4 - 12.3 FL    nRBC 0.00 0.0 - 0.2 K/uL    Differential Type AUTOMATED      Seg Neutrophils 89 (H) 43 - 78 %    Lymphocytes 7 (L) 13 - 44 %    Monocytes 4 4.0 - 12.0 %    Eosinophils % 0 (L) 0.5 - 7.8 %    Basophils 0 0.0 - 2.0 %    Immature Granulocytes 0 0.0 - 5.0 %    Segs Absolute 5.4 1.7 - 8.2 K/UL    Absolute Lymph # 0.4 (L) 0.5 - 4.6 K/UL    Absolute Mono # 0.3 0.1 - 1.3 K/UL    Absolute Eos # 0.0 0.0 - 0.8 K/UL    Basophils Absolute 0.0 0.0 - 0.2 K/UL    Absolute Immature Granulocyte 0.0 0.0 - 0.5 K/UL   Ammonia    Collection Time: 11/23/22  4:13 AM   Result Value Ref Range    Ammonia 19 11 - 32 UMOL/L   Folate    Collection Time: 11/23/22  4:13 AM   Result Value Ref Range    Folate 17.0 3.1 - 17.5 ng/mL   Vitamin B12    Collection Time: 11/23/22  4:13 AM   Result Value Ref Range    Vitamin B-12 362 193 - 986 pg/mL         Other Studies:  XR CHEST (2 VW)    Result Date: 11/22/2022  EXAM: Chest x-ray. INDICATION: Cough and fever. COMPARISON: None. TECHNIQUE: Frontal and lateral view chest x-ray. FINDINGS: The lungs are clear. The cardiac size, mediastinal contour and pulmonary vasculature are normal. No pneumothorax or pleural effusion is seen. There are degenerative changes in the spine and atherosclerotic calcifications in the aortic arch. No acute process. CT HEAD WO CONTRAST    Result Date: 11/22/2022  History: Hypertension, dementia Exam: CT head without contrast Technique:  Thin section axial CT images were obtained from the skullbase through the vertex. Radiation dose reduction techniques were used for this study. Our CT scanners use one or all of the following: Automated exposure control, adjustment of the mA and/or kV according to patient size, use of iterative reconstruction. Findings: There is generalized cerebral atrophy with chronic appearing white matter change in the corona radiata and centrum semiovale. The ventricles are normal in size, shape, and position. No extra-axial fluid collection is present. There is no mass or mass-effect. The basilar cisterns are patent. The paranasal sinuses and mastoid air cells are clear. Chronic appearing white matter change without acute intracranial abnormality. MRI BRAIN WO CONTRAST    Result Date: 11/23/2022  MRI BRAIN WITHOUT CONTRAST 11/22/2022 HISTORY: Gait instability. Encephalopathy. TECHNIQUE: Sagittal and axial T1-weighted, axial T2-weighted, axial and coronal FLAIR, axial T2-weighted gradient-echo, axial diffusion weighted images with ADC maps of the brain. COMPARISON: Head CT 11/22/2022 FINDINGS: There is no acute infarction, acute intracranial hemorrhage, intra-axial mass, hydrocephalus, or extra-axial hematoma. The cerebellar tonsils are in a normal position. On the T2-weighted and FLAIR sequences, there are multiple white matter hyperintensities throughout the periventricular brain. This is not an uncommon finding which may be present with asymptomatic patients, with migraine headaches or with mild chronic small vessel ischemic disease. There is an old lacunar infarct in the left corona radiata anteriorly. Mild cerebral volume loss is present without disproportionate hippocampal atrophy. A right trace mastoid effusion is present. The remaining paranasal sinuses are clear where visible. 1.  No acute infarction. 2.  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 3.  Right mastoid effusion.   This finding may be present with mastoiditis in the appropriate clinical setting.       Current Meds:  Current Facility-Administered Medications   Medication Dose Route Frequency    oseltamivir (TAMIFLU) capsule 30 mg  30 mg Oral BID    atorvastatin (LIPITOR) tablet 20 mg  20 mg Oral Nightly    gabapentin (NEURONTIN) capsule 200 mg  200 mg Oral BID    0.9 % sodium chloride infusion   IntraVENous Continuous    sodium chloride flush 0.9 % injection 5-40 mL  5-40 mL IntraVENous 2 times per day    sodium chloride flush 0.9 % injection 5-40 mL  5-40 mL IntraVENous PRN    0.9 % sodium chloride infusion   IntraVENous PRN    enoxaparin (LOVENOX) injection 40 mg  40 mg SubCUTAneous Q24H    ondansetron (ZOFRAN-ODT) disintegrating tablet 4 mg  4 mg Oral Q8H PRN    Or    ondansetron (ZOFRAN) injection 4 mg  4 mg IntraVENous Q6H PRN    polyethylene glycol (GLYCOLAX) packet 17 g  17 g Oral Daily PRN    acetaminophen (TYLENOL) tablet 650 mg  650 mg Oral Q6H PRN    Or    acetaminophen (TYLENOL) suppository 650 mg  650 mg Rectal Q6H PRN    benzocaine-menthol (CEPACOL SORE THROAT) lozenge 1 lozenge  1 lozenge Oral Q2H PRN    aspirin EC tablet 81 mg  81 mg Oral Daily    estradiol (ESTRACE) tablet 1 mg  1 mg Oral Daily    fluticasone (FLONASE) 50 MCG/ACT nasal spray 1 spray  1 spray Each Nostril Daily    lamoTRIgine (LAMICTAL) tablet 100 mg  100 mg Oral BID    cetirizine (ZYRTEC) tablet 5 mg  5 mg Oral Daily    montelukast (SINGULAIR) tablet 10 mg  10 mg Oral Daily    vitamin E capsule 800 Units  800 Units Oral BID    oyster shell calcium w/D 500-200 MG-UNIT tablet 1 tablet  1 tablet Oral Daily    FLUoxetine (PROZAC) capsule 20 mg  20 mg Oral Daily    losartan (COZAAR) tablet 100 mg  100 mg Oral Daily    And    [Held by provider] hydroCHLOROthiazide (HYDRODIURIL) tablet 12.5 mg  12.5 mg Oral Daily    potassium chloride (KLOR-CON M) extended release tablet 10 mEq  10 mEq Oral Daily with breakfast    trospium (SANCTURA) tablet 20 mg  20 mg Oral BID AC Signed:  Vero Miles MD    Part of this note may have been written by using a voice dictation software. The note has been proof read but may still contain some grammatical/other typographical errors.

## 2022-11-24 NOTE — PROGRESS NOTES
Pt resting in bed comfortably at this time, alert and oriented times 4. No distress noted, respirations even and unlabored on room air. IVF infusing. Pt instructed to call for assistance if needed, call light in place, will continue to monitor. Will prepare for bedside shift report.

## 2022-11-24 NOTE — PROGRESS NOTES
BP 93/50. Dr. Daily Pleas on the floor. New orders to start IVF again. New order for NS @ 75. Will monitor. Pt with poor po intake today. Encouraged pt to drink. Offered her gingeral and water. Will monitor.

## 2022-11-25 LAB
BACTERIA SPEC CULT: NORMAL
OSMOLALITY STL: 594 MOSMOL/KG
SERVICE CMNT-IMP: NORMAL

## 2022-11-25 PROCEDURE — 6360000002 HC RX W HCPCS: Performed by: STUDENT IN AN ORGANIZED HEALTH CARE EDUCATION/TRAINING PROGRAM

## 2022-11-25 PROCEDURE — 6370000000 HC RX 637 (ALT 250 FOR IP): Performed by: STUDENT IN AN ORGANIZED HEALTH CARE EDUCATION/TRAINING PROGRAM

## 2022-11-25 PROCEDURE — 6370000000 HC RX 637 (ALT 250 FOR IP): Performed by: HOSPITALIST

## 2022-11-25 PROCEDURE — 1100000000 HC RM PRIVATE

## 2022-11-25 PROCEDURE — 2580000003 HC RX 258: Performed by: STUDENT IN AN ORGANIZED HEALTH CARE EDUCATION/TRAINING PROGRAM

## 2022-11-25 RX ORDER — DIPHENOXYLATE HYDROCHLORIDE AND ATROPINE SULFATE 2.5; .025 MG/1; MG/1
2 TABLET ORAL
Status: COMPLETED | OUTPATIENT
Start: 2022-11-25 | End: 2022-11-25

## 2022-11-25 RX ORDER — DIPHENOXYLATE HYDROCHLORIDE AND ATROPINE SULFATE 2.5; .025 MG/1; MG/1
1 TABLET ORAL EVERY 6 HOURS PRN
Status: DISCONTINUED | OUTPATIENT
Start: 2022-11-25 | End: 2022-11-30 | Stop reason: HOSPADM

## 2022-11-25 RX ADMIN — Medication 1 TABLET: at 08:55

## 2022-11-25 RX ADMIN — DIPHENOXYLATE HYDROCHLORIDE AND ATROPINE SULFATE 2 TABLET: 2.5; .025 TABLET ORAL at 08:55

## 2022-11-25 RX ADMIN — LOSARTAN POTASSIUM 50 MG: 50 TABLET, FILM COATED ORAL at 08:58

## 2022-11-25 RX ADMIN — GUAIFENESIN SYRUP AND DEXTROMETHORPHAN 5 ML: 100; 10 SYRUP ORAL at 09:00

## 2022-11-25 RX ADMIN — FLUTICASONE PROPIONATE 1 SPRAY: 50 SPRAY, METERED NASAL at 09:00

## 2022-11-25 RX ADMIN — LAMOTRIGINE 100 MG: 100 TABLET ORAL at 08:56

## 2022-11-25 RX ADMIN — GABAPENTIN 200 MG: 400 CAPSULE ORAL at 21:31

## 2022-11-25 RX ADMIN — OSELTAMIVIR PHOSPHATE 30 MG: 30 CAPSULE ORAL at 21:31

## 2022-11-25 RX ADMIN — ASPIRIN 81 MG: 81 TABLET ORAL at 08:56

## 2022-11-25 RX ADMIN — Medication 800 UNITS: at 08:55

## 2022-11-25 RX ADMIN — FLUOXETINE HYDROCHLORIDE 20 MG: 10 CAPSULE ORAL at 08:55

## 2022-11-25 RX ADMIN — ENOXAPARIN SODIUM 40 MG: 100 INJECTION SUBCUTANEOUS at 21:30

## 2022-11-25 RX ADMIN — LAMOTRIGINE 100 MG: 100 TABLET ORAL at 21:31

## 2022-11-25 RX ADMIN — Medication 800 UNITS: at 21:31

## 2022-11-25 RX ADMIN — TROSPIUM CHLORIDE 20 MG: 20 TABLET, FILM COATED ORAL at 16:36

## 2022-11-25 RX ADMIN — GUAIFENESIN SYRUP AND DEXTROMETHORPHAN 5 ML: 100; 10 SYRUP ORAL at 16:36

## 2022-11-25 RX ADMIN — OSELTAMIVIR PHOSPHATE 30 MG: 30 CAPSULE ORAL at 08:56

## 2022-11-25 RX ADMIN — MONTELUKAST 10 MG: 10 TABLET, FILM COATED ORAL at 08:56

## 2022-11-25 RX ADMIN — TROSPIUM CHLORIDE 20 MG: 20 TABLET, FILM COATED ORAL at 06:31

## 2022-11-25 RX ADMIN — SODIUM CHLORIDE, PRESERVATIVE FREE 5 ML: 5 INJECTION INTRAVENOUS at 21:31

## 2022-11-25 RX ADMIN — POTASSIUM CHLORIDE 10 MEQ: 750 TABLET, EXTENDED RELEASE ORAL at 08:55

## 2022-11-25 RX ADMIN — GABAPENTIN 200 MG: 400 CAPSULE ORAL at 08:56

## 2022-11-25 RX ADMIN — ATORVASTATIN CALCIUM 20 MG: 10 TABLET, FILM COATED ORAL at 21:31

## 2022-11-25 RX ADMIN — ESTRADIOL 1 MG: 1 TABLET ORAL at 08:55

## 2022-11-25 RX ADMIN — SODIUM CHLORIDE, PRESERVATIVE FREE 10 ML: 5 INJECTION INTRAVENOUS at 09:01

## 2022-11-25 RX ADMIN — CETIRIZINE HYDROCHLORIDE 5 MG: 5 TABLET ORAL at 08:56

## 2022-11-25 RX ADMIN — GUAIFENESIN SYRUP AND DEXTROMETHORPHAN 5 ML: 100; 10 SYRUP ORAL at 21:30

## 2022-11-25 ASSESSMENT — PAIN SCALES - GENERAL
PAINLEVEL_OUTOF10: 0

## 2022-11-25 NOTE — PROGRESS NOTES
Hospitalist Progress Note   Admit Date:  2022 11:14 AM   Name:  Mark Howard   Age:  80 y.o. Sex:  female  :  1935   MRN:  [id number]   Room:  [facility]/    Presenting Complaint: confusion and weakness  Reason(s) for Admission: Acute encephalopathy [G93.40]     Hospital Course & Interval History:   Patient is a 60-year-old female with history of HTN, dementia, HLD, bipolar disorder admitted on  with acute encephalopathy, generalized weakness and inability to walk. Patient is hard of hearing at baseline. Patient was tested positive for influenza A. Patient has been afebrile since admission, reports of cough, nasal congestion and some sore throat. Subjective/24hr Events (22): Patient seen at bedside, resting comfortably. She is alert and awake, AOx3. Patient is more coherent this morning, feels better overall, is able to get up and use the restroom by herself and brushing her teeth independently. Denies any overnight events including fever, chills, chest pain, nausea vomiting diarrhea. Cough is improved. ROS:  10 point review of system is negative except for what mentioned above. Assessment & Plan:     Principal Problem:    Acute encephalopathy  Plan: -  Mentation is improving, patient is AOx3. Likely from underlying influenza A infection. Continue to monitor for any changes. Active Problems:    General weakness  Plan: -  PT OT eval  Likely from underlying influenza and age-related debility. Inability to walk  Plan: -  Patient is making good progress, able to ambulate without any difficulty. Continue PT OT eval  Patient may benefit from rehab placement. Influenza A  Plan: -  Tamiflu 30 mg p.o. twice daily  Continue Flonase, Singulair p.o. daily. Mucinex 600 mg p.o. twice daily along with guaifenesin for cough.       Bipolar 1 disorder ([facility] [address]  Plan: -  Continue Lamictal 20 mg p.o. twice daily  Prozac 20 mg p.o. daily      HTN 140/59, pulse 62, temperature 98.4 °F (36.9 °C), resp. rate 18, height 5' 2\" (1.575 m), weight 131 lb 1.6 oz (59.5 kg), SpO2 92 %. General:    Frail, elderly, alert and awake, NAD  Head:  Normocephalic, atraumatic  Eyes:  Sclerae appear normal. Pupils equally round. ENT:  Nares appear normal, no drainage. Moist oral mucosa  Neck:  No restricted ROM. Trachea midline. CV:   RRR. No m/r/g. No jugular venous distension. Lungs:   CTAB. No wheezing, rhonchi, or rales. Respirations even, unlabored. Abdomen: Bowel sounds present. Soft, nontender, nondistended. Extremities: No cyanosis or clubbing. No edema. Skin:     No rashes and normal coloration. Warm and dry. Neuro:  GCS 15, cranial nerves intact, no motor or sensory deficit, moving all 4 extremities spontaneously  Psych:  AOx3, flat affect    I have reviewed ordered lab tests and independently visualized imaging below:    Recent Labs:  Recent Results (from the past 48 hour(s))   Clostridium Difficile Toxin/Antigen    Collection Time: 11/24/22 10:22 AM    Specimen: Stool   Result Value Ref Range    GDH Antigen C. DIFFICILE GDH ANTIGEN-NEGATIVE      C difficile Toxin, EIA C. DIFFICILE TOXIN-NEGATIVE      Reflex NOT APPLICABLE      C Diff Toxin Interpretation NEGATIVE FOR TOXIGENIC C. DIFFICILE NTXCD      CLINICAL CONSIDERATION NEGATIVE FOR TOXIGENIC C. DIFFICILE           Other Studies:  XR CHEST (2 VW)    Result Date: 11/22/2022  EXAM: Chest x-ray. INDICATION: Cough and fever. COMPARISON: None. TECHNIQUE: Frontal and lateral view chest x-ray. FINDINGS: The lungs are clear. The cardiac size, mediastinal contour and pulmonary vasculature are normal. No pneumothorax or pleural effusion is seen. There are degenerative changes in the spine and atherosclerotic calcifications in the aortic arch. No acute process. CT HEAD WO CONTRAST    Result Date: 11/22/2022  History: Hypertension, dementia Exam: CT head without contrast Technique:  Thin section axial CT disease. 3.  Right mastoid effusion. This finding may be present with mastoiditis in the appropriate clinical setting.       Current Meds:  Current Facility-Administered Medications   Medication Dose Route Frequency    diphenoxylate-atropine (LOMOTIL) 2.5-0.025 MG per tablet 2 tablet  2 tablet Oral NOW    diphenoxylate-atropine (LOMOTIL) 2.5-0.025 MG per tablet 1 tablet  1 tablet Oral Q6H PRN    [Held by provider] hydroCHLOROthiazide (HYDRODIURIL) tablet 12.5 mg  12.5 mg Oral Daily    And    losartan (COZAAR) tablet 50 mg  50 mg Oral Daily    guaiFENesin-dextromethorphan (ROBITUSSIN DM) 100-10 MG/5ML syrup 5 mL  5 mL Oral TID    oseltamivir (TAMIFLU) capsule 30 mg  30 mg Oral BID    atorvastatin (LIPITOR) tablet 20 mg  20 mg Oral Nightly    gabapentin (NEURONTIN) capsule 200 mg  200 mg Oral BID    sodium chloride flush 0.9 % injection 5-40 mL  5-40 mL IntraVENous 2 times per day    sodium chloride flush 0.9 % injection 5-40 mL  5-40 mL IntraVENous PRN    0.9 % sodium chloride infusion   IntraVENous PRN    enoxaparin (LOVENOX) injection 40 mg  40 mg SubCUTAneous Q24H    ondansetron (ZOFRAN-ODT) disintegrating tablet 4 mg  4 mg Oral Q8H PRN    Or    ondansetron (ZOFRAN) injection 4 mg  4 mg IntraVENous Q6H PRN    polyethylene glycol (GLYCOLAX) packet 17 g  17 g Oral Daily PRN    acetaminophen (TYLENOL) tablet 650 mg  650 mg Oral Q6H PRN    Or    acetaminophen (TYLENOL) suppository 650 mg  650 mg Rectal Q6H PRN    benzocaine-menthol (CEPACOL SORE THROAT) lozenge 1 lozenge  1 lozenge Oral Q2H PRN    aspirin EC tablet 81 mg  81 mg Oral Daily    estradiol (ESTRACE) tablet 1 mg  1 mg Oral Daily    fluticasone (FLONASE) 50 MCG/ACT nasal spray 1 spray  1 spray Each Nostril Daily    lamoTRIgine (LAMICTAL) tablet 100 mg  100 mg Oral BID    cetirizine (ZYRTEC) tablet 5 mg  5 mg Oral Daily    montelukast (SINGULAIR) tablet 10 mg  10 mg Oral Daily    vitamin E capsule 800 Units  800 Units Oral BID    oyster shell calcium w/D 500-200 MG-UNIT tablet 1 tablet  1 tablet Oral Daily    FLUoxetine (PROZAC) capsule 20 mg  20 mg Oral Daily    potassium chloride (KLOR-CON M) extended release tablet 10 mEq  10 mEq Oral Daily with breakfast    trospium (SANCTURA) tablet 20 mg  20 mg Oral BID AC       Signed:  Vida Saba MD    Part of this note may have been written by using a voice dictation software. The note has been proof read but may still contain some grammatical/other typographical errors.

## 2022-11-25 NOTE — CARE COORDINATION
[facility] has accepted patient for rehab. Unfortunately, they don't accept patients over the weekend. A bed will likely be available to Monday. CM following.

## 2022-11-25 NOTE — PROGRESS NOTES
Report received from off going RN. Patient alert and oriented, NAD on room air with respirations regular and unlabored. Assisted with BRP. NS at 75 infusing. Will continue to monitor. No further needs or questions at this time.

## 2022-11-26 PROBLEM — E87.6 HYPOKALEMIA: Status: ACTIVE | Noted: 2022-11-26

## 2022-11-26 LAB
ANION GAP SERPL CALC-SCNC: 4 MMOL/L (ref 2–11)
BUN SERPL-MCNC: 11 MG/DL (ref 8–23)
CALCIUM SERPL-MCNC: 8.2 MG/DL (ref 8.3–10.4)
CHLORIDE SERPL-SCNC: 112 MMOL/L (ref 101–110)
CO2 SERPL-SCNC: 25 MMOL/L (ref 21–32)
CREAT SERPL-MCNC: 0.7 MG/DL (ref 0.6–1)
GLUCOSE SERPL-MCNC: 90 MG/DL (ref 65–100)
MAGNESIUM SERPL-MCNC: 2.1 MG/DL (ref 1.8–2.4)
POTASSIUM SERPL-SCNC: 3.3 MMOL/L (ref 3.5–5.1)
SODIUM SERPL-SCNC: 141 MMOL/L (ref 133–143)

## 2022-11-26 PROCEDURE — 1100000000 HC RM PRIVATE

## 2022-11-26 PROCEDURE — 6370000000 HC RX 637 (ALT 250 FOR IP): Performed by: STUDENT IN AN ORGANIZED HEALTH CARE EDUCATION/TRAINING PROGRAM

## 2022-11-26 PROCEDURE — 2580000003 HC RX 258: Performed by: STUDENT IN AN ORGANIZED HEALTH CARE EDUCATION/TRAINING PROGRAM

## 2022-11-26 PROCEDURE — 80048 BASIC METABOLIC PNL TOTAL CA: CPT

## 2022-11-26 PROCEDURE — 83735 ASSAY OF MAGNESIUM: CPT

## 2022-11-26 PROCEDURE — 36415 COLL VENOUS BLD VENIPUNCTURE: CPT

## 2022-11-26 PROCEDURE — 6370000000 HC RX 637 (ALT 250 FOR IP): Performed by: HOSPITALIST

## 2022-11-26 PROCEDURE — 6370000000 HC RX 637 (ALT 250 FOR IP): Performed by: FAMILY MEDICINE

## 2022-11-26 PROCEDURE — 6360000002 HC RX W HCPCS: Performed by: STUDENT IN AN ORGANIZED HEALTH CARE EDUCATION/TRAINING PROGRAM

## 2022-11-26 RX ORDER — LOSARTAN POTASSIUM 50 MG/1
100 TABLET ORAL DAILY
Status: DISCONTINUED | OUTPATIENT
Start: 2022-11-27 | End: 2022-11-30 | Stop reason: HOSPADM

## 2022-11-26 RX ORDER — POTASSIUM CHLORIDE 20 MEQ/1
40 TABLET, EXTENDED RELEASE ORAL EVERY 4 HOURS
Status: COMPLETED | OUTPATIENT
Start: 2022-11-26 | End: 2022-11-26

## 2022-11-26 RX ORDER — LOSARTAN POTASSIUM 50 MG/1
50 TABLET ORAL ONCE
Status: COMPLETED | OUTPATIENT
Start: 2022-11-26 | End: 2022-11-26

## 2022-11-26 RX ADMIN — LOSARTAN POTASSIUM 50 MG: 50 TABLET, FILM COATED ORAL at 16:04

## 2022-11-26 RX ADMIN — LOSARTAN POTASSIUM 50 MG: 50 TABLET, FILM COATED ORAL at 10:02

## 2022-11-26 RX ADMIN — ATORVASTATIN CALCIUM 20 MG: 10 TABLET, FILM COATED ORAL at 21:35

## 2022-11-26 RX ADMIN — GUAIFENESIN SYRUP AND DEXTROMETHORPHAN 5 ML: 100; 10 SYRUP ORAL at 21:35

## 2022-11-26 RX ADMIN — FLUTICASONE PROPIONATE 1 SPRAY: 50 SPRAY, METERED NASAL at 10:04

## 2022-11-26 RX ADMIN — OSELTAMIVIR PHOSPHATE 30 MG: 30 CAPSULE ORAL at 10:02

## 2022-11-26 RX ADMIN — GUAIFENESIN SYRUP AND DEXTROMETHORPHAN 5 ML: 100; 10 SYRUP ORAL at 10:03

## 2022-11-26 RX ADMIN — GABAPENTIN 200 MG: 400 CAPSULE ORAL at 10:03

## 2022-11-26 RX ADMIN — GUAIFENESIN SYRUP AND DEXTROMETHORPHAN 5 ML: 100; 10 SYRUP ORAL at 15:59

## 2022-11-26 RX ADMIN — CETIRIZINE HYDROCHLORIDE 5 MG: 5 TABLET ORAL at 10:02

## 2022-11-26 RX ADMIN — POTASSIUM CHLORIDE 10 MEQ: 750 TABLET, EXTENDED RELEASE ORAL at 10:02

## 2022-11-26 RX ADMIN — LAMOTRIGINE 100 MG: 100 TABLET ORAL at 10:03

## 2022-11-26 RX ADMIN — POTASSIUM CHLORIDE 40 MEQ: 1500 TABLET, EXTENDED RELEASE ORAL at 20:57

## 2022-11-26 RX ADMIN — GABAPENTIN 200 MG: 400 CAPSULE ORAL at 21:35

## 2022-11-26 RX ADMIN — Medication 1 TABLET: at 10:02

## 2022-11-26 RX ADMIN — POTASSIUM CHLORIDE 40 MEQ: 1500 TABLET, EXTENDED RELEASE ORAL at 16:00

## 2022-11-26 RX ADMIN — SODIUM CHLORIDE, PRESERVATIVE FREE 5 ML: 5 INJECTION INTRAVENOUS at 10:04

## 2022-11-26 RX ADMIN — ENOXAPARIN SODIUM 40 MG: 100 INJECTION SUBCUTANEOUS at 21:34

## 2022-11-26 RX ADMIN — SODIUM CHLORIDE, PRESERVATIVE FREE 5 ML: 5 INJECTION INTRAVENOUS at 21:37

## 2022-11-26 RX ADMIN — MONTELUKAST 10 MG: 10 TABLET, FILM COATED ORAL at 10:02

## 2022-11-26 RX ADMIN — LAMOTRIGINE 100 MG: 100 TABLET ORAL at 21:35

## 2022-11-26 RX ADMIN — Medication 800 UNITS: at 21:35

## 2022-11-26 RX ADMIN — TROSPIUM CHLORIDE 20 MG: 20 TABLET, FILM COATED ORAL at 16:00

## 2022-11-26 RX ADMIN — ESTRADIOL 1 MG: 1 TABLET ORAL at 10:02

## 2022-11-26 RX ADMIN — OSELTAMIVIR PHOSPHATE 30 MG: 30 CAPSULE ORAL at 21:35

## 2022-11-26 RX ADMIN — FLUOXETINE HYDROCHLORIDE 20 MG: 10 CAPSULE ORAL at 10:03

## 2022-11-26 RX ADMIN — ASPIRIN 81 MG: 81 TABLET ORAL at 10:03

## 2022-11-26 RX ADMIN — TROSPIUM CHLORIDE 20 MG: 20 TABLET, FILM COATED ORAL at 06:43

## 2022-11-26 RX ADMIN — Medication 800 UNITS: at 10:02

## 2022-11-26 NOTE — PROGRESS NOTES
Hospitalist Progress Note   Admit Date:  2022 11:14 AM   Name:  Deedee Warren   Age:  80 y.o. Sex:  female  :  1935   MRN:  [id number]   Room:  [facility]/    Presenting Complaint: No chief complaint on file. Reason(s) for Admission: Acute encephalopathy [G93.40]     Hospital Course:   Patient is a 42-year-old female with history of HTN, dementia, HLD, bipolar disorder admitted on  with acute encephalopathy, generalized weakness and inability to walk. Patient is hard of hearing at baseline. Patient was tested positive for influenza A. Patient has been afebrile since admission, reports of cough, nasal congestion and some sore throat. Subjective & 24hr Events (22):   C/o diarrhea  C.diff negative, stool culture negative      Assessment & Plan:     Acute encephalopathy  -  Mentation is improving, patient is AOx3. Likely from underlying influenza A infection. Continue to monitor for any changes. - awake,alert    Hypokalemia   Prob sec to diarrhea  Replace with po kcl         General weakness  -  PT OT eval  Likely from underlying influenza and age-related debility. Inability to walk  Plan: -  Patient is making good progress, able to ambulate without any difficulty. Continue PT OT eval  Patient may benefit from rehab placement. Influenza A  -  Tamiflu 30 mg p.o. twice daily  Continue Flonase, Singulair p.o. daily. Mucinex 600 mg p.o. twice daily along with guaifenesin for cough. Bipolar 1 disorder ([facility] [address]  -  Continue Lamictal 20 mg p.o. twice daily  Prozac 20 mg p.o. daily       HTN (hypertension)  -  BP is optimally controlled with losartan 100 mg po daily  - uncontrolled,pt on losartan 50 mg, increased to 100 mg       Dementia ([facility] [address]  Plan: Noted, conservative treatment.     Diarrhea  Prn lomotil          Discharge Planning:    Patient has been accepted to [facility] however facility does not accept patients over the weekend and will be discharged to the rehab on Monday at the earliest.        Diet:  ADULT DIET; Regular; Low Fat/Low Chol/High Fiber/DAMIAN; Low Sodium (2 gm)  DVT PPx: Lovenox  Code status: Full Code    Hospital Problems:  Principal Problem:    Acute encephalopathy  Active Problems:    General weakness    Inability to walk    Influenza A    Hypokalemia    Bipolar 1 disorder (HCC)    HTN (hypertension)    Dementia (HCC)  Resolved Problems:    * No resolved hospital problems. *      Objective:   Patient Vitals for the past 24 hrs:   Temp Pulse Resp BP SpO2   11/26/22 1453 98.6 °F (37 °C) 63 16 (!) 170/70 91 %   11/26/22 1121 97.9 °F (36.6 °C) 63 18 (!) 169/74 98 %   11/26/22 0748 98.2 °F (36.8 °C) 61 18 (!) 166/69 94 %   11/26/22 0308 97.7 °F (36.5 °C) 62 18 (!) 148/73 94 %   11/25/22 2253 98.2 °F (36.8 °C) 63 20 (!) 141/63 94 %   11/25/22 2000 98.1 °F (36.7 °C) 63 18 (!) 117/54 --       Oxygen Therapy  SpO2: 91 %  O2 Device: None (Room air)    Estimated body mass index is 23.98 kg/m² as calculated from the following:    Height as of this encounter: 5' 2\" (1.575 m). Weight as of this encounter: 131 lb 1.6 oz (59.5 kg). Intake/Output Summary (Last 24 hours) at 11/26/2022 1546  Last data filed at 11/26/2022 1005  Gross per 24 hour   Intake 120 ml   Output --   Net 120 ml         Physical Exam:     Blood pressure (!) 170/70, pulse 63, temperature 98.6 °F (37 °C), temperature source Oral, resp. rate 16, height 5' 2\" (1.575 m), weight 131 lb 1.6 oz (59.5 kg), SpO2 91 %. General:    Well nourished. Head:  Normocephalic, atraumatic  Eyes:  Sclerae appear normal.  Pupils equally round. ENT:  Nares appear normal, no drainage. Moist oral mucosa  Neck:  No restricted ROM. Trachea midline   CV:   RRR. No m/r/g. No jugular venous distension. Lungs:   CTAB. No wheezing, rhonchi, or rales. Symmetric expansion. Abdomen: Bowel sounds present. Soft, nontender, nondistended. Extremities: No cyanosis or clubbing. No edema  Skin:     No rashes and normal coloration. Warm and dry. Neuro:  CN II-XII grossly intact. Sensation intact. A&Ox3  Psych:  Normal mood and affect. I have personally reviewed labs and tests showing:  Recent Labs:  Recent Results (from the past 48 hour(s))   Basic Metabolic Panel w/ Reflex to MG    Collection Time: 11/26/22  9:00 AM   Result Value Ref Range    Sodium 141 133 - 143 mmol/L    Potassium 3.3 (L) 3.5 - 5.1 mmol/L    Chloride 112 (H) 101 - 110 mmol/L    CO2 25 21 - 32 mmol/L    Anion Gap 4 2 - 11 mmol/L    Glucose 90 65 - 100 mg/dL    BUN 11 8 - 23 MG/DL    Creatinine 0.70 0.6 - 1.0 MG/DL    Est, Glom Filt Rate >60 >60 ml/min/1.73m2    Calcium 8.2 (L) 8.3 - 10.4 MG/DL   Magnesium    Collection Time: 11/26/22  9:00 AM   Result Value Ref Range    Magnesium 2.1 1.8 - 2.4 mg/dL       I have personally reviewed imaging studies showing: Other Studies:  MRI BRAIN WO CONTRAST   Final Result      1. No acute infarction. 2.  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 3.  Right mastoid effusion. This finding may be present with mastoiditis in the   appropriate clinical setting.           Current Meds:  Current Facility-Administered Medications   Medication Dose Route Frequency    potassium chloride (KLOR-CON M) extended release tablet 40 mEq  40 mEq Oral Q4H    diphenoxylate-atropine (LOMOTIL) 2.5-0.025 MG per tablet 1 tablet  1 tablet Oral Q6H PRN    [Held by provider] hydroCHLOROthiazide (HYDRODIURIL) tablet 12.5 mg  12.5 mg Oral Daily    And    losartan (COZAAR) tablet 50 mg  50 mg Oral Daily    guaiFENesin-dextromethorphan (ROBITUSSIN DM) 100-10 MG/5ML syrup 5 mL  5 mL Oral TID    oseltamivir (TAMIFLU) capsule 30 mg  30 mg Oral BID    atorvastatin (LIPITOR) tablet 20 mg  20 mg Oral Nightly    gabapentin (NEURONTIN) capsule 200 mg  200 mg Oral BID    sodium chloride flush 0.9 % injection 5-40 mL 5-40 mL IntraVENous 2 times per day    sodium chloride flush 0.9 % injection 5-40 mL  5-40 mL IntraVENous PRN    0.9 % sodium chloride infusion   IntraVENous PRN    enoxaparin (LOVENOX) injection 40 mg  40 mg SubCUTAneous Q24H    ondansetron (ZOFRAN-ODT) disintegrating tablet 4 mg  4 mg Oral Q8H PRN    Or    ondansetron (ZOFRAN) injection 4 mg  4 mg IntraVENous Q6H PRN    polyethylene glycol (GLYCOLAX) packet 17 g  17 g Oral Daily PRN    acetaminophen (TYLENOL) tablet 650 mg  650 mg Oral Q6H PRN    Or    acetaminophen (TYLENOL) suppository 650 mg  650 mg Rectal Q6H PRN    benzocaine-menthol (CEPACOL SORE THROAT) lozenge 1 lozenge  1 lozenge Oral Q2H PRN    aspirin EC tablet 81 mg  81 mg Oral Daily    estradiol (ESTRACE) tablet 1 mg  1 mg Oral Daily    fluticasone (FLONASE) 50 MCG/ACT nasal spray 1 spray  1 spray Each Nostril Daily    lamoTRIgine (LAMICTAL) tablet 100 mg  100 mg Oral BID    cetirizine (ZYRTEC) tablet 5 mg  5 mg Oral Daily    montelukast (SINGULAIR) tablet 10 mg  10 mg Oral Daily    vitamin E capsule 800 Units  800 Units Oral BID    oyster shell calcium w/D 500-200 MG-UNIT tablet 1 tablet  1 tablet Oral Daily    FLUoxetine (PROZAC) capsule 20 mg  20 mg Oral Daily    potassium chloride (KLOR-CON M) extended release tablet 10 mEq  10 mEq Oral Daily with breakfast    trospium (SANCTURA) tablet 20 mg  20 mg Oral BID AC       Signed:  Miri Green MD    .

## 2022-11-27 LAB
ANION GAP SERPL CALC-SCNC: 4 MMOL/L (ref 2–11)
BUN SERPL-MCNC: 12 MG/DL (ref 8–23)
CALCIUM SERPL-MCNC: 8.4 MG/DL (ref 8.3–10.4)
CHLORIDE SERPL-SCNC: 111 MMOL/L (ref 101–110)
CO2 SERPL-SCNC: 22 MMOL/L (ref 21–32)
CREAT SERPL-MCNC: 0.7 MG/DL (ref 0.6–1)
GLUCOSE BLD STRIP.AUTO-MCNC: 153 MG/DL (ref 65–100)
GLUCOSE SERPL-MCNC: 130 MG/DL (ref 65–100)
POTASSIUM SERPL-SCNC: 4.5 MMOL/L (ref 3.5–5.1)
SERVICE CMNT-IMP: ABNORMAL
SODIUM SERPL-SCNC: 137 MMOL/L (ref 133–143)

## 2022-11-27 PROCEDURE — 80048 BASIC METABOLIC PNL TOTAL CA: CPT

## 2022-11-27 PROCEDURE — 2580000003 HC RX 258: Performed by: STUDENT IN AN ORGANIZED HEALTH CARE EDUCATION/TRAINING PROGRAM

## 2022-11-27 PROCEDURE — 6370000000 HC RX 637 (ALT 250 FOR IP): Performed by: FAMILY MEDICINE

## 2022-11-27 PROCEDURE — 36415 COLL VENOUS BLD VENIPUNCTURE: CPT

## 2022-11-27 PROCEDURE — 6370000000 HC RX 637 (ALT 250 FOR IP): Performed by: STUDENT IN AN ORGANIZED HEALTH CARE EDUCATION/TRAINING PROGRAM

## 2022-11-27 PROCEDURE — 6360000002 HC RX W HCPCS: Performed by: STUDENT IN AN ORGANIZED HEALTH CARE EDUCATION/TRAINING PROGRAM

## 2022-11-27 PROCEDURE — 6370000000 HC RX 637 (ALT 250 FOR IP): Performed by: HOSPITALIST

## 2022-11-27 PROCEDURE — 82962 GLUCOSE BLOOD TEST: CPT

## 2022-11-27 PROCEDURE — 1100000000 HC RM PRIVATE

## 2022-11-27 RX ADMIN — OSELTAMIVIR PHOSPHATE 30 MG: 30 CAPSULE ORAL at 09:16

## 2022-11-27 RX ADMIN — ESTRADIOL 1 MG: 1 TABLET ORAL at 09:16

## 2022-11-27 RX ADMIN — POTASSIUM CHLORIDE 10 MEQ: 750 TABLET, EXTENDED RELEASE ORAL at 09:16

## 2022-11-27 RX ADMIN — LOSARTAN POTASSIUM 100 MG: 50 TABLET, FILM COATED ORAL at 09:17

## 2022-11-27 RX ADMIN — Medication 800 UNITS: at 22:04

## 2022-11-27 RX ADMIN — SODIUM CHLORIDE, PRESERVATIVE FREE 5 ML: 5 INJECTION INTRAVENOUS at 22:00

## 2022-11-27 RX ADMIN — TROSPIUM CHLORIDE 20 MG: 20 TABLET, FILM COATED ORAL at 06:41

## 2022-11-27 RX ADMIN — Medication 800 UNITS: at 09:16

## 2022-11-27 RX ADMIN — FLUOXETINE HYDROCHLORIDE 20 MG: 10 CAPSULE ORAL at 09:15

## 2022-11-27 RX ADMIN — LAMOTRIGINE 100 MG: 100 TABLET ORAL at 22:03

## 2022-11-27 RX ADMIN — MONTELUKAST 10 MG: 10 TABLET, FILM COATED ORAL at 09:17

## 2022-11-27 RX ADMIN — GUAIFENESIN SYRUP AND DEXTROMETHORPHAN 5 ML: 100; 10 SYRUP ORAL at 09:17

## 2022-11-27 RX ADMIN — Medication 1 TABLET: at 09:16

## 2022-11-27 RX ADMIN — OSELTAMIVIR PHOSPHATE 30 MG: 30 CAPSULE ORAL at 22:40

## 2022-11-27 RX ADMIN — GUAIFENESIN SYRUP AND DEXTROMETHORPHAN 5 ML: 100; 10 SYRUP ORAL at 22:04

## 2022-11-27 RX ADMIN — SODIUM CHLORIDE, PRESERVATIVE FREE 10 ML: 5 INJECTION INTRAVENOUS at 09:19

## 2022-11-27 RX ADMIN — ONDANSETRON 4 MG: 4 TABLET, ORALLY DISINTEGRATING ORAL at 00:46

## 2022-11-27 RX ADMIN — ATORVASTATIN CALCIUM 20 MG: 10 TABLET, FILM COATED ORAL at 22:03

## 2022-11-27 RX ADMIN — TROSPIUM CHLORIDE 20 MG: 20 TABLET, FILM COATED ORAL at 15:23

## 2022-11-27 RX ADMIN — GABAPENTIN 200 MG: 400 CAPSULE ORAL at 09:15

## 2022-11-27 RX ADMIN — FLUTICASONE PROPIONATE 1 SPRAY: 50 SPRAY, METERED NASAL at 09:19

## 2022-11-27 RX ADMIN — ENOXAPARIN SODIUM 40 MG: 100 INJECTION SUBCUTANEOUS at 22:03

## 2022-11-27 RX ADMIN — CETIRIZINE HYDROCHLORIDE 5 MG: 5 TABLET ORAL at 09:17

## 2022-11-27 RX ADMIN — GABAPENTIN 200 MG: 400 CAPSULE ORAL at 22:03

## 2022-11-27 RX ADMIN — LAMOTRIGINE 100 MG: 100 TABLET ORAL at 09:16

## 2022-11-27 RX ADMIN — GUAIFENESIN SYRUP AND DEXTROMETHORPHAN 5 ML: 100; 10 SYRUP ORAL at 15:23

## 2022-11-27 RX ADMIN — ASPIRIN 81 MG: 81 TABLET ORAL at 09:15

## 2022-11-27 ASSESSMENT — PAIN SCALES - GENERAL
PAINLEVEL_OUTOF10: 0

## 2022-11-27 NOTE — PROGRESS NOTES
Hospitalist Progress Note   Admit Date:  2022 11:14 AM   Name:  Alek Molina   Age:  80 y.o. Sex:  female  :  1935   MRN:  [id number]   Room:  [facility]/    Presenting Complaint: No chief complaint on file. Reason(s) for Admission: Acute encephalopathy [G93.40]     Hospital Course:   Patient is a 59-year-old female with history of HTN, dementia, HLD, bipolar disorder admitted on  with acute encephalopathy, generalized weakness and inability to walk. Patient is hard of hearing at baseline. Patient was tested positive for influenza A. Patient has been afebrile since admission, reports of cough, nasal congestion and some sore throat. Course during the stay  Admitted for flu A , generalized weakness. Also developed diarrhea , negative test results. Improving diarrhea. For discharge to rehab tomorrow      Subjective & 24hr Events (22):     C/o diarrhea  C.diff negative, stool culture negative      Says had 1 episode of diarrhea since this am        Assessment & Plan:     Acute encephalopathy  -  Mentation is improving, patient is AOx3. Likely from underlying influenza A infection. Continue to monitor for any changes. - awake,alert    Hypokalemia   Prob sec to diarrhea  Replace with po kcl   - resolved         General weakness  -  PT OT eval  Likely from underlying influenza and age-related debility. Inability to walk  Plan: -  Patient is making good progress, able to ambulate without any difficulty. Continue PT OT eval  Patient may benefit from rehab placement. Influenza A  -  Tamiflu 30 mg p.o. twice daily  Continue Flonase, Singulair p.o. daily. Mucinex 600 mg p.o. twice daily along with guaifenesin for cough.        Bipolar 1 disorder ([facility] [address]  -  Continue Lamictal 20 mg p.o. twice daily  Prozac 20 mg p.o. daily       HTN (hypertension)  -  BP is optimally controlled with losartan 100 mg po daily  - uncontrolled,pt on losartan 50 mg, increased to 100 mg       Dementia (HCC)  Plan: Noted, conservative treatment. Diarrhea  Prn lomotil          Discharge Planning:    Patient has been accepted to [facility] however facility does not accept patients over the weekend and will be discharged to the rehab on Monday at the earliest.        Diet:  ADULT DIET; Regular; Low Fat/Low Chol/High Fiber/DAMIAN; Low Sodium (2 gm)  DVT PPx: Lovenox  Code status: Full Code    Hospital Problems:  Principal Problem:    Acute encephalopathy  Active Problems:    General weakness    Inability to walk    Influenza A    Hypokalemia    Bipolar 1 disorder (HCC)    HTN (hypertension)    Dementia (HCC)  Resolved Problems:    * No resolved hospital problems. *      Objective:   Patient Vitals for the past 24 hrs:   Temp Pulse Resp BP SpO2   11/27/22 0832 97.9 °F (36.6 °C) 77 19 128/67 92 %   11/27/22 0428 97.9 °F (36.6 °C) 77 18 132/82 91 %   11/26/22 2336 98.1 °F (36.7 °C) 82 -- (!) 154/83 91 %   11/26/22 2200 -- -- -- (!) 171/80 --   11/26/22 2052 98.1 °F (36.7 °C) 78 -- (!) 176/65 92 %   11/26/22 1453 98.6 °F (37 °C) 63 16 (!) 170/70 91 %   11/26/22 1121 97.9 °F (36.6 °C) 63 18 (!) 169/74 98 %       Oxygen Therapy  SpO2: 92 %  O2 Device: None (Room air)    Estimated body mass index is 23.98 kg/m² as calculated from the following:    Height as of this encounter: 5' 2\" (1.575 m). Weight as of this encounter: 131 lb 1.6 oz (59.5 kg). Intake/Output Summary (Last 24 hours) at 11/27/2022 1004  Last data filed at 11/26/2022 1816  Gross per 24 hour   Intake 240 ml   Output --   Net 240 ml         Physical Exam:     Blood pressure 128/67, pulse 77, temperature 97.9 °F (36.6 °C), temperature source Oral, resp. rate 19, height 5' 2\" (1.575 m), weight 131 lb 1.6 oz (59.5 kg), SpO2 92 %. General:    Well nourished. Head:  Normocephalic, atraumatic  Eyes:  Sclerae appear normal.  Pupils equally round. ENT:  Nares appear normal, no drainage. Moist oral mucosa  Neck:  No restricted ROM. Trachea midline   CV:   RRR. No m/r/g. No jugular venous distension. Lungs:   CTAB. No wheezing, rhonchi, or rales. Symmetric expansion. Abdomen: Bowel sounds present. Soft, nontender, nondistended. Extremities: No cyanosis or clubbing. No edema  Skin:     No rashes and normal coloration. Warm and dry. Neuro:  CN II-XII grossly intact. Sensation intact. A&Ox3  Psych:  Normal mood and affect. I have personally reviewed labs and tests showing:  Recent Labs:  Recent Results (from the past 48 hour(s))   Basic Metabolic Panel w/ Reflex to MG    Collection Time: 11/26/22  9:00 AM   Result Value Ref Range    Sodium 141 133 - 143 mmol/L    Potassium 3.3 (L) 3.5 - 5.1 mmol/L    Chloride 112 (H) 101 - 110 mmol/L    CO2 25 21 - 32 mmol/L    Anion Gap 4 2 - 11 mmol/L    Glucose 90 65 - 100 mg/dL    BUN 11 8 - 23 MG/DL    Creatinine 0.70 0.6 - 1.0 MG/DL    Est, Glom Filt Rate >60 >60 ml/min/1.73m2    Calcium 8.2 (L) 8.3 - 10.4 MG/DL   Magnesium    Collection Time: 11/26/22  9:00 AM   Result Value Ref Range    Magnesium 2.1 1.8 - 2.4 mg/dL   Basic Metabolic Panel w/ Reflex to MG    Collection Time: 11/27/22  5:10 AM   Result Value Ref Range    Sodium 137 133 - 143 mmol/L    Potassium 4.5 3.5 - 5.1 mmol/L    Chloride 111 (H) 101 - 110 mmol/L    CO2 22 21 - 32 mmol/L    Anion Gap 4 2 - 11 mmol/L    Glucose 130 (H) 65 - 100 mg/dL    BUN 12 8 - 23 MG/DL    Creatinine 0.70 0.6 - 1.0 MG/DL    Est, Glom Filt Rate >60 >60 ml/min/1.73m2    Calcium 8.4 8.3 - 10.4 MG/DL       I have personally reviewed imaging studies showing: Other Studies:  MRI BRAIN WO CONTRAST   Final Result      1. No acute infarction. 2.  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 3.  Right mastoid effusion. This finding may be present with mastoiditis in the   appropriate clinical setting. Current Meds:  Current Facility-Administered Medications   Medication Dose Route Frequency    [Held by provider] hydroCHLOROthiazide (HYDRODIURIL) tablet 12.5 mg  12.5 mg Oral Daily    And    losartan (COZAAR) tablet 100 mg  100 mg Oral Daily    diphenoxylate-atropine (LOMOTIL) 2.5-0.025 MG per tablet 1 tablet  1 tablet Oral Q6H PRN    guaiFENesin-dextromethorphan (ROBITUSSIN DM) 100-10 MG/5ML syrup 5 mL  5 mL Oral TID    oseltamivir (TAMIFLU) capsule 30 mg  30 mg Oral BID    atorvastatin (LIPITOR) tablet 20 mg  20 mg Oral Nightly    gabapentin (NEURONTIN) capsule 200 mg  200 mg Oral BID    sodium chloride flush 0.9 % injection 5-40 mL  5-40 mL IntraVENous 2 times per day    sodium chloride flush 0.9 % injection 5-40 mL  5-40 mL IntraVENous PRN    0.9 % sodium chloride infusion   IntraVENous PRN    enoxaparin (LOVENOX) injection 40 mg  40 mg SubCUTAneous Q24H    ondansetron (ZOFRAN-ODT) disintegrating tablet 4 mg  4 mg Oral Q8H PRN    Or    ondansetron (ZOFRAN) injection 4 mg  4 mg IntraVENous Q6H PRN    polyethylene glycol (GLYCOLAX) packet 17 g  17 g Oral Daily PRN    acetaminophen (TYLENOL) tablet 650 mg  650 mg Oral Q6H PRN    Or    acetaminophen (TYLENOL) suppository 650 mg  650 mg Rectal Q6H PRN    benzocaine-menthol (CEPACOL SORE THROAT) lozenge 1 lozenge  1 lozenge Oral Q2H PRN    aspirin EC tablet 81 mg  81 mg Oral Daily    estradiol (ESTRACE) tablet 1 mg  1 mg Oral Daily    fluticasone (FLONASE) 50 MCG/ACT nasal spray 1 spray  1 spray Each Nostril Daily    lamoTRIgine (LAMICTAL) tablet 100 mg  100 mg Oral BID    cetirizine (ZYRTEC) tablet 5 mg  5 mg Oral Daily    montelukast (SINGULAIR) tablet 10 mg  10 mg Oral Daily    vitamin E capsule 800 Units  800 Units Oral BID    oyster shell calcium w/D 500-200 MG-UNIT tablet 1 tablet  1 tablet Oral Daily    FLUoxetine (PROZAC) capsule 20 mg  20 mg Oral Daily    potassium chloride (KLOR-CON M) extended release tablet 10 mEq  10 mEq Oral Daily with breakfast trospium (SANCTURA) tablet 20 mg  20 mg Oral BID AC       Signed:  Levi Pierce MD    .

## 2022-11-27 NOTE — PROGRESS NOTES
Pt remains alert and oriented times 4. Pt has no complaints of pain or discomfort at this time. Pt is on room air with even and unlabored respirations.

## 2022-11-28 LAB
ANION GAP SERPL CALC-SCNC: 6 MMOL/L (ref 2–11)
BASOPHILS # BLD: 0 K/UL (ref 0–0.2)
BASOPHILS NFR BLD: 0 % (ref 0–2)
BUN SERPL-MCNC: 14 MG/DL (ref 8–23)
CALCIUM SERPL-MCNC: 8.8 MG/DL (ref 8.3–10.4)
CHLORIDE SERPL-SCNC: 108 MMOL/L (ref 101–110)
CO2 SERPL-SCNC: 23 MMOL/L (ref 21–32)
CREAT SERPL-MCNC: 0.9 MG/DL (ref 0.6–1)
DIFFERENTIAL METHOD BLD: ABNORMAL
EOSINOPHIL # BLD: 0 K/UL (ref 0–0.8)
EOSINOPHIL NFR BLD: 1 % (ref 0.5–7.8)
ERYTHROCYTE [DISTWIDTH] IN BLOOD BY AUTOMATED COUNT: 13.7 % (ref 11.9–14.6)
GLUCOSE SERPL-MCNC: 113 MG/DL (ref 65–100)
HCT VFR BLD AUTO: 32.2 % (ref 35.8–46.3)
HGB BLD-MCNC: 10.6 G/DL (ref 11.7–15.4)
IMM GRANULOCYTES # BLD AUTO: 0 K/UL (ref 0–0.5)
IMM GRANULOCYTES NFR BLD AUTO: 1 % (ref 0–5)
LYMPHOCYTES # BLD: 1.4 K/UL (ref 0.5–4.6)
LYMPHOCYTES NFR BLD: 26 % (ref 13–44)
MCH RBC QN AUTO: 28.9 PG (ref 26.1–32.9)
MCHC RBC AUTO-ENTMCNC: 32.9 G/DL (ref 31.4–35)
MCV RBC AUTO: 87.7 FL (ref 82–102)
MONOCYTES # BLD: 0.4 K/UL (ref 0.1–1.3)
MONOCYTES NFR BLD: 8 % (ref 4–12)
NEUTS SEG # BLD: 3.5 K/UL (ref 1.7–8.2)
NEUTS SEG NFR BLD: 64 % (ref 43–78)
NRBC # BLD: 0 K/UL (ref 0–0.2)
PLATELET # BLD AUTO: 185 K/UL (ref 150–450)
PMV BLD AUTO: 12 FL (ref 9.4–12.3)
POTASSIUM SERPL-SCNC: 4.3 MMOL/L (ref 3.5–5.1)
RBC # BLD AUTO: 3.67 M/UL (ref 4.05–5.2)
SODIUM SERPL-SCNC: 137 MMOL/L (ref 133–143)
WBC # BLD AUTO: 5.4 K/UL (ref 4.3–11.1)

## 2022-11-28 PROCEDURE — 6370000000 HC RX 637 (ALT 250 FOR IP): Performed by: HOSPITALIST

## 2022-11-28 PROCEDURE — 6370000000 HC RX 637 (ALT 250 FOR IP): Performed by: STUDENT IN AN ORGANIZED HEALTH CARE EDUCATION/TRAINING PROGRAM

## 2022-11-28 PROCEDURE — 6370000000 HC RX 637 (ALT 250 FOR IP): Performed by: FAMILY MEDICINE

## 2022-11-28 PROCEDURE — 97530 THERAPEUTIC ACTIVITIES: CPT

## 2022-11-28 PROCEDURE — 80048 BASIC METABOLIC PNL TOTAL CA: CPT

## 2022-11-28 PROCEDURE — 85025 COMPLETE CBC W/AUTO DIFF WBC: CPT

## 2022-11-28 PROCEDURE — 1100000000 HC RM PRIVATE

## 2022-11-28 PROCEDURE — 36415 COLL VENOUS BLD VENIPUNCTURE: CPT

## 2022-11-28 PROCEDURE — 6360000002 HC RX W HCPCS: Performed by: STUDENT IN AN ORGANIZED HEALTH CARE EDUCATION/TRAINING PROGRAM

## 2022-11-28 PROCEDURE — 2580000003 HC RX 258: Performed by: STUDENT IN AN ORGANIZED HEALTH CARE EDUCATION/TRAINING PROGRAM

## 2022-11-28 PROCEDURE — 2500000003 HC RX 250 WO HCPCS: Performed by: STUDENT IN AN ORGANIZED HEALTH CARE EDUCATION/TRAINING PROGRAM

## 2022-11-28 RX ADMIN — GABAPENTIN 200 MG: 400 CAPSULE ORAL at 08:59

## 2022-11-28 RX ADMIN — SODIUM CHLORIDE, PRESERVATIVE FREE 10 ML: 5 INJECTION INTRAVENOUS at 09:00

## 2022-11-28 RX ADMIN — ASPIRIN 81 MG: 81 TABLET ORAL at 08:59

## 2022-11-28 RX ADMIN — ATORVASTATIN CALCIUM 20 MG: 10 TABLET, FILM COATED ORAL at 22:21

## 2022-11-28 RX ADMIN — GUAIFENESIN SYRUP AND DEXTROMETHORPHAN 5 ML: 100; 10 SYRUP ORAL at 14:28

## 2022-11-28 RX ADMIN — Medication 800 UNITS: at 08:59

## 2022-11-28 RX ADMIN — FLUTICASONE PROPIONATE 1 SPRAY: 50 SPRAY, METERED NASAL at 09:00

## 2022-11-28 RX ADMIN — SODIUM CHLORIDE, PRESERVATIVE FREE 10 ML: 5 INJECTION INTRAVENOUS at 22:22

## 2022-11-28 RX ADMIN — MONTELUKAST 10 MG: 10 TABLET, FILM COATED ORAL at 08:59

## 2022-11-28 RX ADMIN — Medication 800 UNITS: at 22:21

## 2022-11-28 RX ADMIN — LAMOTRIGINE 100 MG: 100 TABLET ORAL at 22:21

## 2022-11-28 RX ADMIN — Medication 1 TABLET: at 08:59

## 2022-11-28 RX ADMIN — CETIRIZINE HYDROCHLORIDE 5 MG: 5 TABLET ORAL at 09:00

## 2022-11-28 RX ADMIN — ENOXAPARIN SODIUM 40 MG: 100 INJECTION SUBCUTANEOUS at 22:21

## 2022-11-28 RX ADMIN — POTASSIUM CHLORIDE 10 MEQ: 750 TABLET, EXTENDED RELEASE ORAL at 09:00

## 2022-11-28 RX ADMIN — TROSPIUM CHLORIDE 20 MG: 20 TABLET, FILM COATED ORAL at 16:27

## 2022-11-28 RX ADMIN — ESTRADIOL 1 MG: 1 TABLET ORAL at 08:59

## 2022-11-28 RX ADMIN — LAMOTRIGINE 100 MG: 100 TABLET ORAL at 08:59

## 2022-11-28 RX ADMIN — GUAIFENESIN SYRUP AND DEXTROMETHORPHAN 5 ML: 100; 10 SYRUP ORAL at 22:21

## 2022-11-28 RX ADMIN — GABAPENTIN 200 MG: 400 CAPSULE ORAL at 22:21

## 2022-11-28 RX ADMIN — LOSARTAN POTASSIUM 100 MG: 50 TABLET, FILM COATED ORAL at 08:59

## 2022-11-28 RX ADMIN — FLUOXETINE HYDROCHLORIDE 20 MG: 10 CAPSULE ORAL at 08:59

## 2022-11-28 RX ADMIN — TROSPIUM CHLORIDE 20 MG: 20 TABLET, FILM COATED ORAL at 06:34

## 2022-11-28 RX ADMIN — TUBERCULIN PURIFIED PROTEIN DERIVATIVE 5 UNITS: 5 INJECTION, SOLUTION INTRADERMAL at 13:14

## 2022-11-28 RX ADMIN — GUAIFENESIN SYRUP AND DEXTROMETHORPHAN 5 ML: 100; 10 SYRUP ORAL at 09:00

## 2022-11-28 ASSESSMENT — PAIN SCALES - GENERAL: PAINLEVEL_OUTOF10: 0

## 2022-11-28 NOTE — PROGRESS NOTES
mg p.o. daily       HTN (hypertension)  11/28-  BP is optimally controlled with losartan 100 mg po daily  11/26- uncontrolled,pt on losartan 50 mg, increased to 100 mg       Dementia ([address]  Plan: Noted, conservative treatment. Diarrhea  Prn lomotil          Discharge Planning:    Patient has been accepted to [facility] however facility does not accept pt with diarrhea. Diet:  ADULT DIET; Regular; Low Fat/Low Chol/High Fiber/DAMIAN; Low Sodium (2 gm)  DVT PPx: Lovenox  Code status: Full Code    Hospital Problems:  Principal Problem:    Acute encephalopathy  Active Problems:    General weakness    Inability to walk    Influenza A    Hypokalemia    Bipolar 1 disorder (HCC)    HTN (hypertension)    Dementia (HCC)  Resolved Problems:    * No resolved hospital problems. *      Objective:   Patient Vitals for the past 24 hrs:   Temp Pulse Resp BP SpO2   11/28/22 1611 98.6 °F (37 °C) 63 19 124/83 95 %   11/28/22 1239 98.6 °F (37 °C) 68 19 118/74 94 %   11/28/22 0734 98.3 °F (36.8 °C) 73 19 (!) 151/82 93 %   11/28/22 0415 97.2 °F (36.2 °C) 65 18 124/78 94 %   11/27/22 2338 97.9 °F (36.6 °C) 66 18 115/73 91 %   11/27/22 2009 97.5 °F (36.4 °C) 70 18 124/78 96 %       Oxygen Therapy  SpO2: 95 %  Pulse Oximetry Type: Intermittent  Pulse Oximeter Device Mode: Intermittent  O2 Device: None (Room air)  Oxygen Therapy: None (Room air)    Estimated body mass index is 23.98 kg/m² as calculated from the following:    Height as of this encounter: 5' 2\" (1.575 m). Weight as of this encounter: 131 lb 1.6 oz (59.5 kg). Intake/Output Summary (Last 24 hours) at 11/28/2022 1705  Last data filed at 11/28/2022 1115  Gross per 24 hour   Intake 225 ml   Output 300 ml   Net -75 ml         Physical Exam:     Blood pressure 124/83, pulse 63, temperature 98.6 °F (37 °C), resp. rate 19, height 5' 2\" (1.575 m), weight 131 lb 1.6 oz (59.5 kg), SpO2 95 %. General:    Well nourished.     Head:  Normocephalic, atraumatic  Eyes:  Sclerae appear normal.  Pupils equally round. ENT:  Nares appear normal, no drainage. Moist oral mucosa  Neck:  No restricted ROM. Trachea midline   CV:   RRR. No m/r/g. No jugular venous distension. Lungs:   CTAB. No wheezing, rhonchi, or rales. Symmetric expansion. Abdomen: Bowel sounds present. Soft, nontender, nondistended. Extremities: No cyanosis or clubbing. No edema  Skin:     No rashes and normal coloration. Warm and dry. Neuro:  CN II-XII grossly intact. Sensation intact. A&Ox3  Psych:  Normal mood and affect.       I have personally reviewed labs and tests showing:  Recent Labs:  Recent Results (from the past 48 hour(s))   Basic Metabolic Panel w/ Reflex to MG    Collection Time: 11/27/22  5:10 AM   Result Value Ref Range    Sodium 137 133 - 143 mmol/L    Potassium 4.5 3.5 - 5.1 mmol/L    Chloride 111 (H) 101 - 110 mmol/L    CO2 22 21 - 32 mmol/L    Anion Gap 4 2 - 11 mmol/L    Glucose 130 (H) 65 - 100 mg/dL    BUN 12 8 - 23 MG/DL    Creatinine 0.70 0.6 - 1.0 MG/DL    Est, Glom Filt Rate >60 >60 ml/min/1.73m2    Calcium 8.4 8.3 - 10.4 MG/DL   POCT Glucose    Collection Time: 11/27/22 12:34 PM   Result Value Ref Range    POC Glucose 153 (H) 65 - 100 mg/dL    Performed by: Alberto    CBC with Auto Differential    Collection Time: 11/28/22  3:39 AM   Result Value Ref Range    WBC 5.4 4.3 - 11.1 K/uL    RBC 3.67 (L) 4.05 - 5.2 M/uL    Hemoglobin 10.6 (L) 11.7 - 15.4 g/dL    Hematocrit 32.2 (L) 35.8 - 46.3 %    MCV 87.7 82 - 102 FL    MCH 28.9 26.1 - 32.9 PG    MCHC 32.9 31.4 - 35.0 g/dL    RDW 13.7 11.9 - 14.6 %    Platelets 931 574 - 588 K/uL    MPV 12.0 9.4 - 12.3 FL    nRBC 0.00 0.0 - 0.2 K/uL    Differential Type AUTOMATED      Seg Neutrophils 64 43 - 78 %    Lymphocytes 26 13 - 44 %    Monocytes 8 4.0 - 12.0 %    Eosinophils % 1 0.5 - 7.8 %    Basophils 0 0.0 - 2.0 %    Immature Granulocytes 1 0.0 - 5.0 %    Segs Absolute 3.5 1.7 - 8.2 K/UL    Absolute Lymph # 1.4 0.5 - 4.6 K/UL Absolute Mono # 0.4 0.1 - 1.3 K/UL    Absolute Eos # 0.0 0.0 - 0.8 K/UL    Basophils Absolute 0.0 0.0 - 0.2 K/UL    Absolute Immature Granulocyte 0.0 0.0 - 0.5 K/UL   Basic Metabolic Panel w/ Reflex to MG    Collection Time: 11/28/22  3:39 AM   Result Value Ref Range    Sodium 137 133 - 143 mmol/L    Potassium 4.3 3.5 - 5.1 mmol/L    Chloride 108 101 - 110 mmol/L    CO2 23 21 - 32 mmol/L    Anion Gap 6 2 - 11 mmol/L    Glucose 113 (H) 65 - 100 mg/dL    BUN 14 8 - 23 MG/DL    Creatinine 0.90 0.6 - 1.0 MG/DL    Est, Glom Filt Rate >60 >60 ml/min/1.73m2    Calcium 8.8 8.3 - 10.4 MG/DL       I have personally reviewed imaging studies showing: Other Studies:  MRI BRAIN WO CONTRAST   Final Result      1. No acute infarction. 2.  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 3.  Right mastoid effusion. This finding may be present with mastoiditis in the   appropriate clinical setting.           Current Meds:  Current Facility-Administered Medications   Medication Dose Route Frequency    tuberculin injection 5 Units  5 Units IntraDERmal Once    [Held by provider] hydroCHLOROthiazide (HYDRODIURIL) tablet 12.5 mg  12.5 mg Oral Daily    And    losartan (COZAAR) tablet 100 mg  100 mg Oral Daily    diphenoxylate-atropine (LOMOTIL) 2.5-0.025 MG per tablet 1 tablet  1 tablet Oral Q6H PRN    guaiFENesin-dextromethorphan (ROBITUSSIN DM) 100-10 MG/5ML syrup 5 mL  5 mL Oral TID    atorvastatin (LIPITOR) tablet 20 mg  20 mg Oral Nightly    gabapentin (NEURONTIN) capsule 200 mg  200 mg Oral BID    sodium chloride flush 0.9 % injection 5-40 mL  5-40 mL IntraVENous 2 times per day    sodium chloride flush 0.9 % injection 5-40 mL  5-40 mL IntraVENous PRN    0.9 % sodium chloride infusion   IntraVENous PRN    enoxaparin (LOVENOX) injection 40 mg  40 mg SubCUTAneous Q24H    ondansetron (ZOFRAN-ODT) disintegrating tablet 4 mg  4 mg Oral Q8H PRN Or    ondansetron (ZOFRAN) injection 4 mg  4 mg IntraVENous Q6H PRN    polyethylene glycol (GLYCOLAX) packet 17 g  17 g Oral Daily PRN    acetaminophen (TYLENOL) tablet 650 mg  650 mg Oral Q6H PRN    Or    acetaminophen (TYLENOL) suppository 650 mg  650 mg Rectal Q6H PRN    benzocaine-menthol (CEPACOL SORE THROAT) lozenge 1 lozenge  1 lozenge Oral Q2H PRN    aspirin EC tablet 81 mg  81 mg Oral Daily    estradiol (ESTRACE) tablet 1 mg  1 mg Oral Daily    fluticasone (FLONASE) 50 MCG/ACT nasal spray 1 spray  1 spray Each Nostril Daily    lamoTRIgine (LAMICTAL) tablet 100 mg  100 mg Oral BID    cetirizine (ZYRTEC) tablet 5 mg  5 mg Oral Daily    montelukast (SINGULAIR) tablet 10 mg  10 mg Oral Daily    vitamin E capsule 800 Units  800 Units Oral BID    oyster shell calcium w/D 500-200 MG-UNIT tablet 1 tablet  1 tablet Oral Daily    FLUoxetine (PROZAC) capsule 20 mg  20 mg Oral Daily    potassium chloride (KLOR-CON M) extended release tablet 10 mEq  10 mEq Oral Daily with breakfast    trospium (SANCTURA) tablet 20 mg  20 mg Oral BID AC       Signed:  Lauren Cabello MD    .

## 2022-11-28 NOTE — PROGRESS NOTES
ACUTE PHYSICAL THERAPY GOALS:   (Developed with and agreed upon by patient and/or caregiver. )  LTG:  (1.)Ms. Adolm Mortimer will move from supine to sit and sit to supine , scoot up and down, and roll side to side in bed with INDEPENDENT within 5 treatment day(s). (2.)Ms. Adolm Mortimer will transfer from bed to chair and chair to bed with SUPERVISION using the least restrictive device within 5 treatment day(s). (3.)Ms. Adolm Mortimer will ambulate with SUPERVISION for >150 feet with the least restrictive device within 5 treatment day(s). PHYSICAL THERAPY: Daily Note AM   (Link to Caseload Tracking: PT Visit Days : 3  Time In/Out PT Charge Capture  Rehab Caseload Tracker  Orders    Isidra Land is a 80 y.o. female   PRIMARY DIAGNOSIS: Acute encephalopathy  Acute encephalopathy [G93.40]       Inpatient: Payor: MEDICARE / Plan: MEDICARE PART A AND B / Product Type: *No Product type* /     ASSESSMENT:     REHAB RECOMMENDATIONS:   Recommendation to date pending progress:  Setting:  Short-term Rehab    Equipment:    Rolling Walker     ASSESSMENT:  Ms. Adolm Mortimer is supine on arrival, agreeable to mobility. Pt SBA to CGA for bed mobility, transfers. Pt HHA for ambulation in room, unsteady with gait, CGA to MIN A support. Pt with slow brandyn, cautious at times, shuffled steps. Pt on RA, O2 stats >90% with activity, HR 90, coughing with mobility. Pt making steady progress toward goals. PT to cont to follow for acute care needs.       SUBJECTIVE:   Ms. Adolm Mortimer states, \"I am doing much better now\"     Social/Functional Lives With: Family  Type of Home: House  OBJECTIVE:     PAIN: [place] / O2: Jayde Bita / Chelo Gum / DRAINS:   Pre Treatment: 0/10         Post Treatment: 0 Vitals        Oxygen  O2 Therapy: Room air None    RESTRICTIONS/PRECAUTIONS:        MOBILITY: I Mod I S SBA CGA Min Mod Max Total  NT x2 Comments:   Bed Mobility    Rolling [] [] [] [] [] [] [] [] [] [] []    Supine to Sit [] [] [] [] [x] [] [] [] [] [] []    Scooting [] [] [] [x] [x] [] [] [] [] [] []    Sit to Supine [] [] [] [] [] [] [] [] [] [x] [] In chair   Transfers    Sit to Stand [] [] [] [] [x] [] [] [] [] [] []    Bed to Chair [] [] [] [] [x] [x] [] [] [] [] []    Stand to Sit [] [] [] [] [x] [x] [] [] [] [] []     [] [] [] [] [] [] [] [] [] [] []    I=Independent, Mod I=Modified Independent, S=Supervision, SBA=Standby Assistance, CGA=Contact Guard Assistance,   Min=Minimal Assistance, Mod=Moderate Assistance, Max=Maximal Assistance, Total=Total Assistance, NT=Not Tested    BALANCE: Good Fair+ Fair Fair- Poor NT Comments   Sitting Static [x] [] [] [] [] []    Sitting Dynamic [] [x] [] [] [] []              Standing Static [] [x] [] [] [] []    Standing Dynamic [] [] [x] [] [] []      GAIT: I Mod I S SBA CGA Min Mod Max Total  NT x2 Comments:   Level of Assistance [] [] [] [] [] [x] [] [] [] [] []    Distance   50 feet    DME HHA    Gait Quality Decreased brandyn , Decreased step clearance, and Decreased step length    Weightbearing Status      Stairs      I=Independent, Mod I=Modified Independent, S=Supervision, SBA=Standby Assistance, CGA=Contact Guard Assistance,   Min=Minimal Assistance, Mod=Moderate Assistance, Max=Maximal Assistance, Total=Total Assistance, NT=Not Tested    PLAN:   FREQUENCY AND DURATION: 3 times/week for duration of hospital stay or until stated goals are met, whichever comes first.    TREATMENT:   TREATMENT:   Therapeutic Activity (23 Minutes): Therapeutic activity included Supine to Sit, Sit to Supine, Transfer Training, Ambulation on level ground, Sitting balance , and Standing balance to improve functional Activity tolerance, Balance, Mobility, and Strength.     TREATMENT GRID:  N/A    AFTER TREATMENT PRECAUTIONS: Alarm Activated, Bed/Chair Locked, Call light within reach, Chair, Needs within reach, and RN notified    INTERDISCIPLINARY COLLABORATION:  RN/ PCT and PT/ PTA    EDUCATION: Education Given To: Patient  Education Provided: Transfer Training  Education Method: Verbal  Barriers to Learning: None  Education Outcome: Continued education needed    TIME IN/OUT:  Time In: 1100  Time Out: [address]  Minutes: [address], PT

## 2022-11-28 NOTE — CARE COORDINATION
LMSW follow up with [facility] in admissions  at [facility] today. [facility] can accept pt for care on Wednesday 11/30 pending updated PT/OT notes, PPD 48 hour read negative, diarrhea resolved, no flu symptoms and a negative rapid COVID resulted  either Tuesday or Wednesday. CM will follow up and coordinate pt transition to rehab as appropriate.

## 2022-11-29 LAB
MM INDURATION, POC: 0 MM (ref 0–5)
PPD, POC: NEGATIVE

## 2022-11-29 PROCEDURE — 6370000000 HC RX 637 (ALT 250 FOR IP): Performed by: STUDENT IN AN ORGANIZED HEALTH CARE EDUCATION/TRAINING PROGRAM

## 2022-11-29 PROCEDURE — 6370000000 HC RX 637 (ALT 250 FOR IP): Performed by: HOSPITALIST

## 2022-11-29 PROCEDURE — 6360000002 HC RX W HCPCS: Performed by: STUDENT IN AN ORGANIZED HEALTH CARE EDUCATION/TRAINING PROGRAM

## 2022-11-29 PROCEDURE — 6370000000 HC RX 637 (ALT 250 FOR IP): Performed by: FAMILY MEDICINE

## 2022-11-29 PROCEDURE — 1100000000 HC RM PRIVATE

## 2022-11-29 PROCEDURE — 2580000003 HC RX 258: Performed by: STUDENT IN AN ORGANIZED HEALTH CARE EDUCATION/TRAINING PROGRAM

## 2022-11-29 PROCEDURE — 97535 SELF CARE MNGMENT TRAINING: CPT

## 2022-11-29 RX ORDER — LOPERAMIDE HYDROCHLORIDE 2 MG/1
2 CAPSULE ORAL 4 TIMES DAILY PRN
Status: DISCONTINUED | OUTPATIENT
Start: 2022-11-29 | End: 2022-11-30 | Stop reason: HOSPADM

## 2022-11-29 RX ORDER — LOPERAMIDE HYDROCHLORIDE 2 MG/1
2 CAPSULE ORAL 4 TIMES DAILY PRN
Status: DISCONTINUED | OUTPATIENT
Start: 2022-11-29 | End: 2022-11-29

## 2022-11-29 RX ADMIN — FLUOXETINE HYDROCHLORIDE 20 MG: 10 CAPSULE ORAL at 09:15

## 2022-11-29 RX ADMIN — POTASSIUM CHLORIDE 10 MEQ: 750 TABLET, EXTENDED RELEASE ORAL at 09:15

## 2022-11-29 RX ADMIN — SODIUM CHLORIDE, PRESERVATIVE FREE 10 ML: 5 INJECTION INTRAVENOUS at 09:17

## 2022-11-29 RX ADMIN — GUAIFENESIN SYRUP AND DEXTROMETHORPHAN 5 ML: 100; 10 SYRUP ORAL at 17:28

## 2022-11-29 RX ADMIN — ASPIRIN 81 MG: 81 TABLET ORAL at 09:15

## 2022-11-29 RX ADMIN — GABAPENTIN 200 MG: 400 CAPSULE ORAL at 20:08

## 2022-11-29 RX ADMIN — GUAIFENESIN SYRUP AND DEXTROMETHORPHAN 5 ML: 100; 10 SYRUP ORAL at 09:16

## 2022-11-29 RX ADMIN — ATORVASTATIN CALCIUM 20 MG: 10 TABLET, FILM COATED ORAL at 20:08

## 2022-11-29 RX ADMIN — CETIRIZINE HYDROCHLORIDE 5 MG: 5 TABLET ORAL at 09:15

## 2022-11-29 RX ADMIN — Medication 800 UNITS: at 21:13

## 2022-11-29 RX ADMIN — GABAPENTIN 200 MG: 400 CAPSULE ORAL at 09:16

## 2022-11-29 RX ADMIN — ESTRADIOL 1 MG: 1 TABLET ORAL at 09:14

## 2022-11-29 RX ADMIN — TROSPIUM CHLORIDE 20 MG: 20 TABLET, FILM COATED ORAL at 17:30

## 2022-11-29 RX ADMIN — FLUTICASONE PROPIONATE 1 SPRAY: 50 SPRAY, METERED NASAL at 09:14

## 2022-11-29 RX ADMIN — SODIUM CHLORIDE, PRESERVATIVE FREE 10 ML: 5 INJECTION INTRAVENOUS at 20:09

## 2022-11-29 RX ADMIN — TROSPIUM CHLORIDE 20 MG: 20 TABLET, FILM COATED ORAL at 06:42

## 2022-11-29 RX ADMIN — GUAIFENESIN SYRUP AND DEXTROMETHORPHAN 5 ML: 100; 10 SYRUP ORAL at 20:09

## 2022-11-29 RX ADMIN — ENOXAPARIN SODIUM 40 MG: 100 INJECTION SUBCUTANEOUS at 20:10

## 2022-11-29 RX ADMIN — LOSARTAN POTASSIUM 100 MG: 50 TABLET, FILM COATED ORAL at 09:15

## 2022-11-29 RX ADMIN — Medication 800 UNITS: at 09:15

## 2022-11-29 RX ADMIN — Medication 1 TABLET: at 09:15

## 2022-11-29 RX ADMIN — LAMOTRIGINE 100 MG: 100 TABLET ORAL at 20:07

## 2022-11-29 RX ADMIN — MONTELUKAST 10 MG: 10 TABLET, FILM COATED ORAL at 09:15

## 2022-11-29 RX ADMIN — LAMOTRIGINE 100 MG: 100 TABLET ORAL at 09:17

## 2022-11-29 NOTE — FLOWSHEET NOTE
Patient on room air. Safety measures noted. Will continue to monitor per policy.      11/29/22 [address] [facility] Received From Argentina Huffman RN   Handoff Communication Face to Face   Time Handoff Given 1414

## 2022-11-29 NOTE — PROGRESS NOTES
Hospitalist Progress Note   Admit Date:  2022 11:14 AM   Name:  Kranthi Diaz   Age:  80 y.o. Sex:  female  :  1935   MRN:  [id number]   Room:  5/    Presenting Complaint: No chief complaint on file. Reason(s) for Admission: Acute encephalopathy [G93.40]     Hospital Course:   Patient is a 80-year-old female with history of HTN, dementia, HLD, bipolar disorder admitted on  with acute encephalopathy, generalized weakness and inability to walk. Patient is hard of hearing at baseline. Patient was tested positive for influenza A. Patient has been afebrile since admission, reports of cough, nasal congestion and some sore throat. Course during the stay  Admitted for flu A , generalized weakness. Also developed diarrhea , negative test results. Improving diarrhea. Subjective & 24hr Events (22):     C/o diarrhea  C.diff negative, stool culture negative      Says had 1 episode of diarrhea since this am       Pt had diarrhea  and not eating much due to cough. She feels much better       Patient is having diarrhea. She is complaining of cough. She is eating well. And feels much better. Spoke to the LETY over the phone and updated regarding her condition. Answered all the questions. Assessment & Plan:     Acute encephalopathy  -  Mentation is improving, patient is AOx3. Likely from underlying influenza A infection. Continue to monitor for any changes. - awake,alert   Resolved     Hypokalemia   Prob sec to diarrhea  Replace with po kcl   and - resolved    Diarrhea  Most likely due to fluid  C. difficile, Salmonella negative  Stool studies negative  Started Imodium as needed         General weakness  -  PT OT recommended STR   Likely from underlying influenza and age-related debility. Inability to walk  Plan:   Patient is making good progress, able to ambulate without any difficulty.   Continue PT OT eval  Patient may benefit from rehab placement. Influenza A  11/28-  Tamiflu 30 mg p.o. twice daily  Continue Flonase, Singulair p.o. daily. Mucinex 600 mg p.o. twice daily along with guaifenesin for cough. Bipolar 1 disorder ([facility] [address]  11/28-  Continue Lamictal 20 mg p.o. twice daily  Prozac 20 mg p.o. daily       HTN (hypertension)  11/28-  BP is optimally controlled with losartan 100 mg po daily  11/26- uncontrolled,pt on losartan 50 mg, increased to 100 mg       Dementia ([facility] [address]  Plan: Noted, conservative treatment. Discharge Planning:    Patient has been accepted to [facility] however facility does not accept pt with diarrhea. Diet:  ADULT DIET; Regular; Low Fat/Low Chol/High Fiber/DAMIAN; Low Sodium (2 gm)  DVT PPx: Lovenox  Code status: Full Code    Hospital Problems:  Principal Problem:    Acute encephalopathy  Active Problems:    General weakness    Inability to walk    Influenza A    Hypokalemia    Bipolar 1 disorder (HCC)    HTN (hypertension)    Dementia (HCC)  Resolved Problems:    * No resolved hospital problems. *      Objective:   Patient Vitals for the past 24 hrs:   Temp Pulse Resp BP SpO2   11/29/22 1502 98.2 °F (36.8 °C) 63 16 124/63 97 %   11/29/22 1152 98.2 °F (36.8 °C) 64 16 129/77 95 %   11/29/22 0730 97.9 °F (36.6 °C) 71 18 137/76 93 %   11/29/22 0309 98.1 °F (36.7 °C) 63 18 119/74 98 %   11/28/22 2258 97.9 °F (36.6 °C) 65 18 (!) 144/73 96 %   11/28/22 1952 97.5 °F (36.4 °C) 70 18 115/83 94 %       Oxygen Therapy  SpO2: 97 %  Pulse Oximetry Type: Intermittent  Pulse Oximeter Device Mode: Intermittent  O2 Device: None (Room air)  Oxygen Therapy: None (Room air)    Estimated body mass index is 23.98 kg/m² as calculated from the following:    Height as of this encounter: 5' 2\" (1.575 m). Weight as of this encounter: 131 lb 1.6 oz (59.5 kg).     Intake/Output Summary (Last 24 hours) at 11/29/2022 1643  Last data filed at 11/29/2022 1502  Gross per 24 hour   Intake 340 ml Output 2 ml   Net 338 ml         Physical Exam:     Blood pressure 124/63, pulse 63, temperature 98.2 °F (36.8 °C), temperature source Oral, resp. rate 16, height 5' 2\" (1.575 m), weight 131 lb 1.6 oz (59.5 kg), SpO2 97 %. General:    Well nourished. Head:  Normocephalic, atraumatic  Eyes:  Sclerae appear normal.  Pupils equally round. ENT:  Nares appear normal, no drainage. Moist oral mucosa  Neck:  No restricted ROM. Trachea midline   CV:   RRR. No m/r/g. No jugular venous distension. Lungs:   CTAB. No wheezing, rhonchi, or rales. Symmetric expansion. Abdomen: Bowel sounds present. Soft, nontender, nondistended. Extremities: No cyanosis or clubbing. No edema  Skin:     No rashes and normal coloration. Warm and dry. Neuro:  CN II-XII grossly intact. Sensation intact. A&Ox3  Psych:  Normal mood and affect.       I have personally reviewed labs and tests showing:  Recent Labs:  Recent Results (from the past 48 hour(s))   CBC with Auto Differential    Collection Time: 11/28/22  3:39 AM   Result Value Ref Range    WBC 5.4 4.3 - 11.1 K/uL    RBC 3.67 (L) 4.05 - 5.2 M/uL    Hemoglobin 10.6 (L) 11.7 - 15.4 g/dL    Hematocrit 32.2 (L) 35.8 - 46.3 %    MCV 87.7 82 - 102 FL    MCH 28.9 26.1 - 32.9 PG    MCHC 32.9 31.4 - 35.0 g/dL    RDW 13.7 11.9 - 14.6 %    Platelets 017 808 - 700 K/uL    MPV 12.0 9.4 - 12.3 FL    nRBC 0.00 0.0 - 0.2 K/uL    Differential Type AUTOMATED      Seg Neutrophils 64 43 - 78 %    Lymphocytes 26 13 - 44 %    Monocytes 8 4.0 - 12.0 %    Eosinophils % 1 0.5 - 7.8 %    Basophils 0 0.0 - 2.0 %    Immature Granulocytes 1 0.0 - 5.0 %    Segs Absolute 3.5 1.7 - 8.2 K/UL    Absolute Lymph # 1.4 0.5 - 4.6 K/UL    Absolute Mono # 0.4 0.1 - 1.3 K/UL    Absolute Eos # 0.0 0.0 - 0.8 K/UL    Basophils Absolute 0.0 0.0 - 0.2 K/UL    Absolute Immature Granulocyte 0.0 0.0 - 0.5 K/UL   Basic Metabolic Panel w/ Reflex to MG    Collection Time: 11/28/22  3:39 AM   Result Value Ref Range Sodium 137 133 - 143 mmol/L    Potassium 4.3 3.5 - 5.1 mmol/L    Chloride 108 101 - 110 mmol/L    CO2 23 21 - 32 mmol/L    Anion Gap 6 2 - 11 mmol/L    Glucose 113 (H) 65 - 100 mg/dL    BUN 14 8 - 23 MG/DL    Creatinine 0.90 0.6 - 1.0 MG/DL    Est, Glom Filt Rate >60 >60 ml/min/1.73m2    Calcium 8.8 8.3 - 10.4 MG/DL       I have personally reviewed imaging studies showing: Other Studies:  MRI BRAIN WO CONTRAST   Final Result      1. No acute infarction. 2.  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 3.  Right mastoid effusion. This finding may be present with mastoiditis in the   appropriate clinical setting.           Current Meds:  Current Facility-Administered Medications   Medication Dose Route Frequency    loperamide (IMODIUM) capsule 2 mg  2 mg Oral 4x Daily PRN    [Held by provider] hydroCHLOROthiazide (HYDRODIURIL) tablet 12.5 mg  12.5 mg Oral Daily    And    losartan (COZAAR) tablet 100 mg  100 mg Oral Daily    diphenoxylate-atropine (LOMOTIL) 2.5-0.025 MG per tablet 1 tablet  1 tablet Oral Q6H PRN    guaiFENesin-dextromethorphan (ROBITUSSIN DM) 100-10 MG/5ML syrup 5 mL  5 mL Oral TID    atorvastatin (LIPITOR) tablet 20 mg  20 mg Oral Nightly    gabapentin (NEURONTIN) capsule 200 mg  200 mg Oral BID    sodium chloride flush 0.9 % injection 5-40 mL  5-40 mL IntraVENous 2 times per day    sodium chloride flush 0.9 % injection 5-40 mL  5-40 mL IntraVENous PRN    0.9 % sodium chloride infusion   IntraVENous PRN    enoxaparin (LOVENOX) injection 40 mg  40 mg SubCUTAneous Q24H    ondansetron (ZOFRAN-ODT) disintegrating tablet 4 mg  4 mg Oral Q8H PRN    Or    ondansetron (ZOFRAN) injection 4 mg  4 mg IntraVENous Q6H PRN    acetaminophen (TYLENOL) tablet 650 mg  650 mg Oral Q6H PRN    Or    acetaminophen (TYLENOL) suppository 650 mg  650 mg Rectal Q6H PRN    benzocaine-menthol (CEPACOL SORE THROAT) lozenge 1 lozenge  1 lozenge Oral Q2H PRN    aspirin EC tablet 81 mg  81 mg Oral Daily    estradiol (ESTRACE) tablet 1 mg  1 mg Oral Daily    fluticasone (FLONASE) 50 MCG/ACT nasal spray 1 spray  1 spray Each Nostril Daily    lamoTRIgine (LAMICTAL) tablet 100 mg  100 mg Oral BID    cetirizine (ZYRTEC) tablet 5 mg  5 mg Oral Daily    montelukast (SINGULAIR) tablet 10 mg  10 mg Oral Daily    vitamin E capsule 800 Units  800 Units Oral BID    oyster shell calcium w/D 500-200 MG-UNIT tablet 1 tablet  1 tablet Oral Daily    FLUoxetine (PROZAC) capsule 20 mg  20 mg Oral Daily    potassium chloride (KLOR-CON M) extended release tablet 10 mEq  10 mEq Oral Daily with breakfast    trospium (SANCTURA) tablet 20 mg  20 mg Oral BID AC       Signed:  Kevin Hugo MD    .

## 2022-11-29 NOTE — PROGRESS NOTES
ACUTE OCCUPATIONAL THERAPY GOALS:   (Developed with and agreed upon by patient and/or caregiver.)  1. Patient will complete lower body bathing and dressing with MOD I and adaptive equipment as needed. 2.Patient will complete upper body bathing and dressing with MOD I and adaptive equipment as needed. 3. Patient will complete toileting with MOD I.   4. Patient will tolerate 30 minutes of OT treatment with 1-2 rest breaks to increase activity tolerance for ADLs. 5. Patient will complete functional transfers with MOD I and adaptive equipment as needed. 6. Patient will complete simple grooming task standing at the sink with MOD I. Timeframe: 7 visits      OCCUPATIONAL THERAPY: Daily Note    OT Visit Days: 2   Time In/Out  OT Charge Capture  Rehab Caseload Tracker  OT Orders    Alek Molina is a 80 y.o. female   PRIMARY DIAGNOSIS: Acute encephalopathy  Acute encephalopathy [G93.40]       Inpatient: Payor: MEDICARE / Plan: MEDICARE PART A AND B / Product Type: *No Product type* /     ASSESSMENT:     REHAB RECOMMENDATIONS: CURRENT LEVEL OF FUNCTION:  (Most Recently Demonstrated)   Recommendation to date pending progress:  Setting:  Short-term Rehab    Equipment:    To Be Determined Bathing:  Not Tested  Dressing:  Contact Guard Assist  Feeding/Grooming:  Minimal Assist  Toileting:  Not Tested  Functional Mobility:  Minimal Assist     ASSESSMENT:  Ms. Lesly Simpson is progressing well towards OT goals. Today, pt was received supine in bed. Completed bed mobility and LB dressing with CGA. Sit>stand and ambulation in room Andrew. Performed grooming tasks standing at the sink for extended period of time with Andrew. Pt remains weak and unsteady--recommend STR at discharge. Ms. Lesly Simpson continues to demonstrate overall deficits in strength, balance, activity tolerance, and ADL performance. Continue OT efforts and POC in order to address functional deficits and OT goals stated above.       SUBJECTIVE:     Ms. Lesly Simpson states, \"I have to take good care of these teeth. \"     Social/Functional Lives With: Family  Type of Home: House    OBJECTIVE:     LINES / [place]per / AIRWAY: NA    RESTRICTIONS/PRECAUTIONS:           PAIN: VITALS / O2:   Pre Treatment:            Post Treatment: no change  Vitals          Oxygen        MOBILITY: I Mod I S SBA CGA Min Mod Max Total  NT x2 Comments:   Bed Mobility    Rolling [] [] [] [] [] [] [] [] [] [x] []    Supine to Sit [] [] [] [] [x] [] [] [] [] [] []    Scooting [] [] [] [] [x] [] [] [] [] [] []    Sit to Supine [] [] [] [] [] [] [] [] [] [x] [] Left sitting in the chair    Transfers    Sit to Stand [] [] [] [] [] [x] [] [] [] [] []    Bed to Chair [] [] [] [] [] [x] [] [] [] [] []    Stand to Sit [] [] [] [] [] [x] [] [] [] [] []    Tub/Shower [] [] [] [] [] [] [] [] [] [x] []     Toilet [] [] [] [] [] [] [] [] [] [x] []      [] [] [] [] [] [] [] [] [] [] []    I=Independent, Mod I=Modified Independent, S=Supervision/Setup, SBA=Standby Assistance, CGA=Contact Guard Assistance, Min=Minimal Assistance, Mod=Moderate Assistance, Max=Maximal Assistance, Total=Total Assistance, NT=Not Tested    ACTIVITIES OF DAILY LIVING: I Mod I S SBA CGA Min Mod Max Total NT Comments   BASIC ADLs:              Upper Body   Bathing [] [] [] [] [] [] [] [] [] [x]    Lower Body Bathing [] [] [] [] [] [] [] [] [] [x]    Toileting [] [] [] [] [] [] [] [] [] [x]    Upper Body Dressing [] [] [] [] [] [] [] [] [] [x]    Lower Body Dressing [] [] [] [] [x] [] [] [] [] [] Socks    Feeding [] [] [] [] [] [] [] [] [] [x]    Grooming [] [] [] [] [] [x] [] [] [] [] Washed face, combed hair, and brushed teeth standing at the sink   Personal Device Care [] [] [] [] [] [] [] [] [] [x]    Functional Mobility [] [] [] [] [] [x] [] [] [] []    I=Independent, Mod I=Modified Independent, S=Supervision/Setup, SBA=Standby Assistance, CGA=Contact Guard Assistance, Min=Minimal Assistance, Mod=Moderate Assistance, Max=Maximal Assistance, Total=Total Assistance, NT=Not Tested    BALANCE: Good Fair+ Fair Fair- Poor NT Comments   Sitting Static [x] [] [] [] [] []    Sitting Dynamic [] [x] [] [] [] []              Standing Static [] [x] [] [] [] []    Standing Dynamic [] [] [x] [] [] []        PLAN:     FREQUENCY/DURATION   OT Plan of Care: 3 times/week for duration of hospital stay or until stated goals are met, whichever comes first.    TREATMENT:     TREATMENT:   Self Care (25 minutes): Patient participated in lower body dressing and grooming ADLs in unsupported sitting and standing with minimal verbal cueing to increase activity tolerance and increase safety awareness. Patient also participated in functional mobility and functional transfer training to increase independence, decrease assistance required, increase activity tolerance, and increase safety awareness.      TREATMENT GRID:  N/A    AFTER TREATMENT PRECAUTIONS: Alarm Activated, Call light within reach, Chair, Needs within reach, and RN notified    INTERDISCIPLINARY COLLABORATION:  RN/ PCT and OT/ BRAVO    EDUCATION:       TOTAL TREATMENT DURATION AND TIME:  Time In: 1400  Time Out: [address]  Minutes: [address] [facility], OT

## 2022-11-30 VITALS
SYSTOLIC BLOOD PRESSURE: 156 MMHG | DIASTOLIC BLOOD PRESSURE: 141 MMHG | HEART RATE: 76 BPM | HEIGHT: 62 IN | OXYGEN SATURATION: 93 % | WEIGHT: 104.7 LBS | TEMPERATURE: 98.1 F | RESPIRATION RATE: 18 BRPM | BODY MASS INDEX: 19.27 KG/M2

## 2022-11-30 LAB
ANION GAP SERPL CALC-SCNC: 9 MMOL/L (ref 2–11)
BASOPHILS # BLD: 0 K/UL (ref 0–0.2)
BASOPHILS NFR BLD: 0 % (ref 0–2)
BUN SERPL-MCNC: 11 MG/DL (ref 8–23)
CALCIUM SERPL-MCNC: 9.3 MG/DL (ref 8.3–10.4)
CHLORIDE SERPL-SCNC: 108 MMOL/L (ref 101–110)
CO2 SERPL-SCNC: 23 MMOL/L (ref 21–32)
CREAT SERPL-MCNC: 0.9 MG/DL (ref 0.6–1)
DIFFERENTIAL METHOD BLD: ABNORMAL
EOSINOPHIL # BLD: 0.1 K/UL (ref 0–0.8)
EOSINOPHIL NFR BLD: 2 % (ref 0.5–7.8)
ERYTHROCYTE [DISTWIDTH] IN BLOOD BY AUTOMATED COUNT: 13.9 % (ref 11.9–14.6)
GLUCOSE SERPL-MCNC: 90 MG/DL (ref 65–100)
HCT VFR BLD AUTO: 32.9 % (ref 35.8–46.3)
HGB BLD-MCNC: 10.8 G/DL (ref 11.7–15.4)
IMM GRANULOCYTES # BLD AUTO: 0.1 K/UL (ref 0–0.5)
IMM GRANULOCYTES NFR BLD AUTO: 2 % (ref 0–5)
LACTOFERRIN, FECAL: 3.26 UG/ML(G) (ref 0–7.24)
LYMPHOCYTES # BLD: 1.3 K/UL (ref 0.5–4.6)
LYMPHOCYTES NFR BLD: 28 % (ref 13–44)
MCH RBC QN AUTO: 28.8 PG (ref 26.1–32.9)
MCHC RBC AUTO-ENTMCNC: 32.8 G/DL (ref 31.4–35)
MCV RBC AUTO: 87.7 FL (ref 82–102)
MM INDURATION, POC: 0 MM (ref 0–5)
MONOCYTES # BLD: 0.5 K/UL (ref 0.1–1.3)
MONOCYTES NFR BLD: 11 % (ref 4–12)
NEUTS SEG # BLD: 2.7 K/UL (ref 1.7–8.2)
NEUTS SEG NFR BLD: 57 % (ref 43–78)
NRBC # BLD: 0 K/UL (ref 0–0.2)
PHOSPHATE SERPL-MCNC: 2.2 MG/DL (ref 2.3–3.7)
PLATELET # BLD AUTO: 284 K/UL (ref 150–450)
PMV BLD AUTO: 12.2 FL (ref 9.4–12.3)
POTASSIUM SERPL-SCNC: 4.2 MMOL/L (ref 3.5–5.1)
PPD, POC: NEGATIVE
RBC # BLD AUTO: 3.75 M/UL (ref 4.05–5.2)
SARS-COV-2 RDRP RESP QL NAA+PROBE: NOT DETECTED
SODIUM SERPL-SCNC: 140 MMOL/L (ref 133–143)
SOURCE: NORMAL
WBC # BLD AUTO: 4.7 K/UL (ref 4.3–11.1)

## 2022-11-30 PROCEDURE — 80048 BASIC METABOLIC PNL TOTAL CA: CPT

## 2022-11-30 PROCEDURE — 6370000000 HC RX 637 (ALT 250 FOR IP): Performed by: HOSPITALIST

## 2022-11-30 PROCEDURE — 85025 COMPLETE CBC W/AUTO DIFF WBC: CPT

## 2022-11-30 PROCEDURE — 87635 SARS-COV-2 COVID-19 AMP PRB: CPT

## 2022-11-30 PROCEDURE — 97530 THERAPEUTIC ACTIVITIES: CPT

## 2022-11-30 PROCEDURE — 36415 COLL VENOUS BLD VENIPUNCTURE: CPT

## 2022-11-30 PROCEDURE — 6370000000 HC RX 637 (ALT 250 FOR IP): Performed by: STUDENT IN AN ORGANIZED HEALTH CARE EDUCATION/TRAINING PROGRAM

## 2022-11-30 PROCEDURE — 84100 ASSAY OF PHOSPHORUS: CPT

## 2022-11-30 PROCEDURE — 2580000003 HC RX 258: Performed by: STUDENT IN AN ORGANIZED HEALTH CARE EDUCATION/TRAINING PROGRAM

## 2022-11-30 PROCEDURE — 97110 THERAPEUTIC EXERCISES: CPT

## 2022-11-30 PROCEDURE — 6370000000 HC RX 637 (ALT 250 FOR IP): Performed by: FAMILY MEDICINE

## 2022-11-30 RX ORDER — GUAIFENESIN/DEXTROMETHORPHAN 100-10MG/5
5 SYRUP ORAL 3 TIMES DAILY
Qty: 150 ML | Refills: 0 | Status: SHIPPED | OUTPATIENT
Start: 2022-11-30 | End: 2022-12-10

## 2022-11-30 RX ORDER — TROSPIUM CHLORIDE 20 MG/1
20 TABLET, FILM COATED ORAL
Qty: 60 TABLET | Refills: 3 | Status: SHIPPED | OUTPATIENT
Start: 2022-11-30

## 2022-11-30 RX ORDER — LOPERAMIDE HYDROCHLORIDE 2 MG/1
2 CAPSULE ORAL 4 TIMES DAILY PRN
Qty: 30 CAPSULE | Refills: 0 | Status: SHIPPED | OUTPATIENT
Start: 2022-11-30 | End: 2022-12-10

## 2022-11-30 RX ORDER — VITAMIN E 268 MG
400 CAPSULE ORAL DAILY
Qty: 30 CAPSULE | Refills: 3 | Status: SHIPPED | OUTPATIENT
Start: 2022-11-30

## 2022-11-30 RX ORDER — LOSARTAN POTASSIUM 50 MG/1
25 TABLET ORAL DAILY
Qty: 90 TABLET | Refills: 1 | Status: SHIPPED | OUTPATIENT
Start: 2022-11-30

## 2022-11-30 RX ADMIN — ESTRADIOL 1 MG: 1 TABLET ORAL at 08:12

## 2022-11-30 RX ADMIN — TROSPIUM CHLORIDE 20 MG: 20 TABLET, FILM COATED ORAL at 14:54

## 2022-11-30 RX ADMIN — GUAIFENESIN SYRUP AND DEXTROMETHORPHAN 5 ML: 100; 10 SYRUP ORAL at 08:13

## 2022-11-30 RX ADMIN — MONTELUKAST 10 MG: 10 TABLET, FILM COATED ORAL at 08:11

## 2022-11-30 RX ADMIN — FLUOXETINE HYDROCHLORIDE 20 MG: 10 CAPSULE ORAL at 08:12

## 2022-11-30 RX ADMIN — LAMOTRIGINE 100 MG: 100 TABLET ORAL at 08:10

## 2022-11-30 RX ADMIN — Medication 800 UNITS: at 08:10

## 2022-11-30 RX ADMIN — FLUTICASONE PROPIONATE 1 SPRAY: 50 SPRAY, METERED NASAL at 08:14

## 2022-11-30 RX ADMIN — LOSARTAN POTASSIUM 100 MG: 50 TABLET, FILM COATED ORAL at 08:12

## 2022-11-30 RX ADMIN — Medication 1 TABLET: at 08:11

## 2022-11-30 RX ADMIN — ASPIRIN 81 MG: 81 TABLET ORAL at 08:12

## 2022-11-30 RX ADMIN — SODIUM CHLORIDE, PRESERVATIVE FREE 10 ML: 5 INJECTION INTRAVENOUS at 08:15

## 2022-11-30 RX ADMIN — TROSPIUM CHLORIDE 20 MG: 20 TABLET, FILM COATED ORAL at 05:09

## 2022-11-30 RX ADMIN — POTASSIUM CHLORIDE 10 MEQ: 750 TABLET, EXTENDED RELEASE ORAL at 08:12

## 2022-11-30 RX ADMIN — GUAIFENESIN SYRUP AND DEXTROMETHORPHAN 5 ML: 100; 10 SYRUP ORAL at 14:52

## 2022-11-30 RX ADMIN — CETIRIZINE HYDROCHLORIDE 5 MG: 5 TABLET ORAL at 08:11

## 2022-11-30 RX ADMIN — GABAPENTIN 200 MG: 400 CAPSULE ORAL at 08:11

## 2022-11-30 NOTE — PROGRESS NOTES
Patient discharged via stretcher for transport to [facility]. Will notify patient's daughter, Elizabeth Trimble.

## 2022-11-30 NOTE — PROGRESS NOTES
ACUTE PHYSICAL THERAPY GOALS:   (Developed with and agreed upon by patient and/or caregiver. )  LTG:  (1.)Ms. [facility] will move from supine to sit and sit to supine , scoot up and down, and roll side to side in bed with INDEPENDENT within 5 treatment day(s). (2.)Ms. [facility] will transfer from bed to chair and chair to bed with SUPERVISION using the least restrictive device within 5 treatment day(s). (3.)Ms. [facility] will ambulate with SUPERVISION for >150 feet with the least restrictive device within 5 treatment day(s). PHYSICAL THERAPY: Daily Note PM   (Link to Caseload Tracking: PT Visit Days : 4  Time In/Out PT Charge Capture  Rehab Caseload Tracker  Orders    Shwetha Oliveira is a 80 y.o. female   PRIMARY DIAGNOSIS: Acute encephalopathy  Acute encephalopathy [G93.40]       Inpatient: Payor: MEDICARE / Plan: MEDICARE PART A AND B / Product Type: *No Product type* /     ASSESSMENT:     REHAB RECOMMENDATIONS:   Recommendation to date pending progress:  Setting:  Short-term Rehab    Equipment:    Rolling Walker     ASSESSMENT:  Ms. [facility] demonstrates improved activity tolerance/gait distance today however continues to be weak with impaired standing balance and decreased activity tolerance. On room air throughout session. Practiced functional transfers and standing static/dynamic activities, exercises. Anticipating discharge to rehab soon.       SUBJECTIVE:   Ms. KARINA DEVINE states, \"I don't think I'll have to stay for very long\"     Social/Functional Lives With: Family  Type of Home: House  OBJECTIVE:     PAIN: VITALS / O2: Laury Zayas / Winston Duong / Magali Quach:   Pre Treatment: 0/10         Post Treatment: 0/10 Vitals        Oxygen on RA    None    RESTRICTIONS/PRECAUTIONS:        MOBILITY: I Mod I S SBA CGA Min Mod Max Total  NT x2 Comments:   Bed Mobility    Rolling [] [] [] [x] [] [] [] [] [] [] []    Supine to Sit [] [] [] [x] [] [] [] [] [] [] []    Scooting [] [] [] [x] [] [] [] [] [] [] []    Sit to Supine [] [] [] [] [] [] [] [] [] [x] [] In chair   Transfers    Sit to Stand [] [] [] [] [x] [] [] [] [] [] []    Bed to Chair [] [] [] [] [x] [x] [] [] [] [] []    Stand to Sit [] [] [] [] [x] [] [] [] [] [] []     [] [] [] [] [] [] [] [] [] [] []    I=Independent, Mod I=Modified Independent, S=Supervision, SBA=Standby Assistance, CGA=Contact Guard Assistance,   Min=Minimal Assistance, Mod=Moderate Assistance, Max=Maximal Assistance, Total=Total Assistance, NT=Not Tested    BALANCE: Good Fair+ Fair Fair- Poor NT Comments   Sitting Static [x] [] [] [] [] []    Sitting Dynamic [] [x] [] [] [] []              Standing Static [] [x] [] [] [] []    Standing Dynamic [] [] [x] [] [] []      GAIT: I Mod I S SBA CGA Min Mod Max Total  NT x2 Comments:   Level of Assistance [] [] [] [] [] [x] [] [] [] [] []    Distance   115 feet    DME HHA    Gait Quality Decreased brandyn , Decreased step clearance, and Decreased step length    Weightbearing Status      Stairs      I=Independent, Mod I=Modified Independent, S=Supervision, SBA=Standby Assistance, CGA=Contact Guard Assistance,   Min=Minimal Assistance, Mod=Moderate Assistance, Max=Maximal Assistance, Total=Total Assistance, NT=Not Tested    PLAN:   FREQUENCY AND DURATION: 3 times/week for duration of hospital stay or until stated goals are met, whichever comes first.    TREATMENT:   TREATMENT:   Therapeutic Activity (15 Minutes): Therapeutic activity included Supine to Sit, Sit to Supine, Transfer Training, Ambulation on level ground, Sitting balance , and Standing balance to improve functional Activity tolerance, Balance, Mobility, and Strength. Therapeutic Exercise (10 Minutes): Therapeutic exercises noted below to improve functional activity tolerance, AROM, strength, and mobility.      TREATMENT GRID:   Date:  11/30/22 Date:   Date:     Activity/Exercise Parameters Parameters Parameters   Standing heel raises 10x     Standing hip abduction 10x     Standing hip extension 10x     Mini squats 10x                           AFTER TREATMENT PRECAUTIONS: Bed, Bed/Chair Locked, Call light within reach, and Needs within reach    INTERDISCIPLINARY COLLABORATION:  RN/ PCT and PT/ PTA    EDUCATION: Education Given To: Patient  Education Provided: Energy Conservation  Education Method: Verbal  Barriers to Learning: None  Education Outcome: Continued education needed;Verbalized understanding    TIME IN/OUT:  Time In: 1336  Time Out: 9950  Minutes: [address] Nyla Navarro PT

## 2022-11-30 NOTE — PROGRESS NOTES
Patient noted fever free >24 hours without medications and 8 days out from positive influenza test. Infection control called to remove precautions from header. Droplet precautions discontinued.

## 2022-11-30 NOTE — CARE COORDINATION
Spoke with Gabbi Paidlla, liaison from [facility]. Pt can d/c today if off isolation for flu and needs another covid swab, as last past 48 hours. Louis Lujan, notified and PS to MD Toño Aguirre. CM following.      Can be reached at ext [phone] or [phone]

## 2022-11-30 NOTE — DISCHARGE SUMMARY
Hospitalist Discharge Summary   Admit Date:  2022 11:14 AM   DC Note date: 2022  Name:  Edward Pandey   Age:  80 y.o. Sex:  female  :  1935   MRN:  [id number]   Room:  [facility]  PCP:  Della Ghosh MD    Presenting Complaint: No chief complaint on file. Initial Admission Diagnosis: Acute encephalopathy [G93.40]     Problem List for this Hospitalization (present on admission):    Principal Problem:    Acute encephalopathy  Active Problems:    General weakness    Inability to walk    Influenza A    Hypokalemia    Bipolar 1 disorder (HCC)    HTN (hypertension)    Dementia (HCC)  Resolved Problems:    * No resolved hospital problems. [facility] Course: Edward Pandey is a 80 y.o. female with past medical history of     HTN, Dementia, HLD and Bipolar disorder who presents via EMS with weakness, confusion and diarrhea. She is hard of hearing   She was not answering questions appropriately. She had fever 103F. She had flu recently. She was tested positive for influenza A. C.diff negative, stool culture negative. Urine analysis negative. CAT scan negative for stroke. She was treated with IV fluids and Imodium. She clinically improved and her mentation improved. Her potassium normalized after supplementing with K. She was treated with Flonase, Singulair p.o. daily. Pt is off droplet isolation. PT OT recommended STR. Hypokalemia  Her home medication potassium is discontinued as her K is normalized. Bipolar 1 disorder (HCC)  Continue Lamictal 20 mg p.o. twice daily and Prozac 20 mg p.o. daily       HTN (hypertension)  She is normotensive  Decreased dose of losartan 25 mg po daily  Discontinued home medication hydrochlorothiazide       Dementia ([facility] [address]  Plan: Noted, conservative treatment     Disposition: Rehab  Diet: ADULT DIET;  Regular; Low Fat/Low Chol/High Fiber/DAMIAN; Low Sodium (2 gm)  Code Status: Full Code    Follow Ups:   Contact information for after-discharge care Discharge Destination     [address] [facility]) . Service: Skilled Nursing  Contact information:  [address]  [phone]                           Time spent in patient discharge and coordination 35 minutes. Follow up labs/diagnostics (ultimately defer to outpatient provider): Follow-up with PCP in 3 to 5 days      Plan was discussed with patient. All questions answered. Patient was stable at time of discharge. Instructions given to call a physician or return if any concerns. Current Discharge Medication List        CONTINUE these medications which have NOT CHANGED    Details   FLUoxetine (PROZAC) 20 MG/5ML solution TAKE 1.25ML BY MOUTH IN THE MORNING AND 2.5ML IN THE EVENING  Qty: 240 mL, Refills: 2    Associated Diagnoses: Vascular dementia without behavioral disturbance (HCC)      atorvastatin (LIPITOR) 10 MG tablet Take 1 tablet by mouth daily TAKE 1 TABLET BY MOUTH NIGHTLY  Qty: 90 tablet, Refills: 1    Associated Diagnoses: Other hyperlipidemia      estradiol (ESTRACE) 1 MG tablet Take 1 tablet by mouth daily  Qty: 90 tablet, Refills: 1    Associated Diagnoses: OAB (overactive bladder)      gabapentin (NEURONTIN) 400 MG capsule Take 1 capsule by mouth in the morning and at bedtime for 90 days.  TAKE ONE CAPSULE BY MOUTH TWICE DAILY  Qty: 180 capsule, Refills: 1    Associated Diagnoses: Peripheral polyneuropathy      lamoTRIgine (LAMICTAL) 100 MG tablet Take 1 tablet by mouth 2 times daily TAKE 1 TABLET BY MOUTH TWICE A DAY  Qty: 180 tablet, Refills: 1    Associated Diagnoses: Bipolar affective disorder, remission status unspecified (HCC)      losartan-hydroCHLOROthiazide (HYZAAR) 100-12.5 MG per tablet Take 1 tablet by mouth Daily with supper  Qty: 90 tablet, Refills: 1    Associated Diagnoses: Essential hypertension      potassium chloride (MICRO-K) 10 MEQ extended release capsule Take 1 capsule by mouth daily TAKE 1 CAPSULE BY MOUTH EVERY DAY  Qty: 90 capsule, Refills: 1    Associated Diagnoses: Essential hypertension      montelukast (SINGULAIR) 10 MG tablet Take 1 tablet by mouth daily  Qty: 90 tablet, Refills: 1    Associated Diagnoses: Allergic rhinitis, unspecified seasonality, unspecified trigger      tolterodine (DETROL LA) 4 MG extended release capsule Take 1 capsule by mouth daily TAKE 1 CAPSULE BY MOUTH EVERY DAY  Qty: 90 capsule, Refills: 1    Associated Diagnoses: OAB (overactive bladder)      aspirin 81 MG EC tablet Take 81 mg by mouth      Calcium Carb-Cholecalciferol 600-800 MG-UNIT CHEW Take 1 tablet by mouth daily      fluticasone (FLONASE) 50 MCG/ACT nasal spray INSTILL 2 SPRAYS INTO BOTH NOSTRILS ONCE DAILY      loratadine (CLARITIN) 10 MG tablet Take 10 mg by mouth      vitamin E 400 UNIT capsule Take 800 Units by mouth 2 times daily             Procedures done this admission:  * No surgery found *    Consults this admission:  IP CONSULT TO NEUROLOGY  IP CONSULT HOME HEALTH    Echocardiogram results:  No results found for this or any previous visit. Diagnostic Imaging/Tests:   XR CHEST (2 VW)    Result Date: 11/22/2022  No acute process. CT HEAD WO CONTRAST    Result Date: 11/22/2022  Chronic appearing white matter change without acute intracranial abnormality. MRI BRAIN WO CONTRAST    Result Date: 11/23/2022  1. No acute infarction. 2.  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 3.  Right mastoid effusion. This finding may be present with mastoiditis in the appropriate clinical setting.        Labs: Results:       BMP, Mg, Phos Recent Labs     11/28/22  0339 11/30/22 0342    140   K 4.3 4.2    108   CO2 23 23   ANIONGAP 6 9   BUN 14 11   CREATININE 0.90 0.90   LABGLOM >60 >60   CALCIUM 8.8 9.3   GLUCOSE 113* 90   PHOS  --  2.2*      CBC Recent Labs     11/28/22  0339 11/30/22  0342   WBC 5.4 4.7   RBC 3.67* 3.75*   HGB 10.6* 10.8*   HCT 32.2* 32.9*   MCV 87.7 87.7   MCH 28.9 28.8   MCHC 32.9 32.8   RDW 13.7 13.9    284   MPV 12.0 12.2   NRBC 0.00 0.00   SEGS 64 57   LYMPHOPCT 26 28   EOSRELPCT 1 2   MONOPCT 8 11   BASOPCT 0 0   IMMGRAN 1 2   SEGSABS 3.5 2.7   LYMPHSABS 1.4 1.3   EOSABS 0.0 0.1   MONOSABS 0.4 0.5   BASOSABS 0.0 0.0   ABSIMMGRAN 0.0 0.1      LFT No results for input(s): BILITOT, BILIDIR, ALKPHOS, AST, ALT, PROT, LABALBU, GLOB in the last 72 hours. Cardiac  No results found for: NTPROBNP, TROPHS   Coags No results found for: PROTIME, INR, APTT   A1c No results found for: LABA1C, EAG   Lipids Lab Results   Component Value Date/Time    CHOL 187 08/22/2022 01:59 PM    LDLCALC 76.4 08/22/2022 01:59 PM    LABVLDL 18.6 08/22/2022 01:59 PM    HDL 92 08/22/2022 01:59 PM    CHOLHDLRATIO 2.0 08/22/2022 01:59 PM    TRIG 93 08/22/2022 01:59 PM      Thyroid  Lab Results   Component Value Date/Time    SZF5DIA 3.560 08/22/2022 01:59 PM        Most Recent UA Lab Results   Component Value Date/Time    COLORU YELLOW/STRAW 11/22/2022 11:00 PM    APPEARANCE CLEAR 11/22/2022 11:00 PM    SPECGRAV 1.020 11/22/2022 11:00 PM    LABPH 5.0 11/22/2022 11:00 PM    PROTEINU 30 11/22/2022 11:00 PM    GLUCOSEU Negative 11/22/2022 11:00 PM    KETUA TRACE 11/22/2022 11:00 PM    BILIRUBINUR Negative 11/22/2022 11:00 PM    BLOODU MODERATE 11/22/2022 11:00 PM    UROBILINOGEN 0.2 11/22/2022 11:00 PM    NITRU Negative 11/22/2022 11:00 PM    LEUKOCYTESUR Negative 11/22/2022 11:00 PM    WBCUA 0-3 11/22/2022 11:00 PM    RBCUA 0 11/22/2022 11:00 PM    EPITHUA 0-3 11/22/2022 11:00 PM    BACTERIA TRACE 11/22/2022 11:00 PM    LABCAST 0 11/22/2022 11:00 PM    MUCUS 0 11/22/2022 11:00 PM        Recent Labs     11/23/22  0247   CULTURE No Salmonella, Shigella, or Ecoli 0157 isolated.        All Labs from Last 24 Hrs:  Recent Results (from the past 24 hour(s))   PLEASE READ & DOCUMENT PPD TEST IN 24 HRS    Collection Time: 11/29/22  6:08 PM   Result Value Ref Range    PPD, (POC) Negative Negative    mm Induration 0.0 0 - 5 mm   CBC with Auto Differential    Collection Time: 11/30/22  3:42 AM   Result Value Ref Range    WBC 4.7 4.3 - 11.1 K/uL    RBC 3.75 (L) 4.05 - 5.2 M/uL    Hemoglobin 10.8 (L) 11.7 - 15.4 g/dL    Hematocrit 32.9 (L) 35.8 - 46.3 %    MCV 87.7 82 - 102 FL    MCH 28.8 26.1 - 32.9 PG    MCHC 32.8 31.4 - 35.0 g/dL    RDW 13.9 11.9 - 14.6 %    Platelets 250 631 - 770 K/uL    MPV 12.2 9.4 - 12.3 FL    nRBC 0.00 0.0 - 0.2 K/uL    Differential Type AUTOMATED      Seg Neutrophils 57 43 - 78 %    Lymphocytes 28 13 - 44 %    Monocytes 11 4.0 - 12.0 %    Eosinophils % 2 0.5 - 7.8 %    Basophils 0 0.0 - 2.0 %    Immature Granulocytes 2 0.0 - 5.0 %    Segs Absolute 2.7 1.7 - 8.2 K/UL    Absolute Lymph # 1.3 0.5 - 4.6 K/UL    Absolute Mono # 0.5 0.1 - 1.3 K/UL    Absolute Eos # 0.1 0.0 - 0.8 K/UL    Basophils Absolute 0.0 0.0 - 0.2 K/UL    Absolute Immature Granulocyte 0.1 0.0 - 0.5 K/UL   Basic Metabolic Panel w/ Reflex to MG    Collection Time: 11/30/22  3:42 AM   Result Value Ref Range    Sodium 140 133 - 143 mmol/L    Potassium 4.2 3.5 - 5.1 mmol/L    Chloride 108 101 - 110 mmol/L    CO2 23 21 - 32 mmol/L    Anion Gap 9 2 - 11 mmol/L    Glucose 90 65 - 100 mg/dL    BUN 11 8 - 23 MG/DL    Creatinine 0.90 0.6 - 1.0 MG/DL    Est, Glom Filt Rate >60 >60 ml/min/1.73m2    Calcium 9.3 8.3 - 10.4 MG/DL   Phosphorus    Collection Time: 11/30/22  3:42 AM   Result Value Ref Range    Phosphorus 2.2 (L) 2.3 - 3.7 MG/DL   PLEASE READ & DOCUMENT PPD TEST IN 48 HRS    Collection Time: 11/30/22  1:41 PM   Result Value Ref Range    PPD, (POC) Negative Negative    mm Induration 0.0 0 - 5 mm       Allergies   Allergen Reactions    Codeine Shortness Of Breath    Benztropine Other (See Comments)     \"stroke-like symptoms\"  Couldn't write, confused (per daughter Osmani Vieira)    Haloperidol Other (See Comments)     \"agitated\" per daughter    Haloperidol Lactate Other (See Comments) Trazodone Other (See Comments)     \"Stroke-like: mouth drooped, speech slurred\" per daughter     Immunization History   Administered Date(s) Administered    COVID-19, PFIZER PURPLE top, DILUTE for use, (age 15 y+), 30mcg/0.3mL 02/18/2022, 03/11/2022    Influenza Trivalent 10/07/2014    Influenza Virus Vaccine 01/13/2014    Influenza, FLUAD, (age 72 y+), Adjuvanted, 0.5mL 10/14/2020    Influenza, High Dose (Fluzone 65 yrs and older) 10/03/2016, 10/10/2017, 10/03/2018    Influenza, Triv, inactivated, subunit, adjuvanted, IM (Fluad 65 yrs and older) 10/08/2019    Influenza, Trivalent PF 09/15/2015    PPD Test 11/28/2022    Pneumococcal Conjugate 13-valent ([id number]) 01/26/2016    Pneumococcal Polysaccharide ([id number]) 10/07/2014    Pneumococcal Vaccine 01/01/2001    Tdap (Boostrix, Adacel) 01/30/2014    Zoster Live (Zostavax) 07/08/2014    Zoster Recombinant (Shingrix) 01/05/2021       Recent Vital Data:  Patient Vitals for the past 24 hrs:   Temp Pulse Resp BP SpO2   11/30/22 1121 97.9 °F (36.6 °C) 69 18 115/71 90 %   11/30/22 0809 98.1 °F (36.7 °C) 69 18 (!) 144/74 96 %   11/30/22 0314 98.2 °F (36.8 °C) 64 16 116/65 96 %   11/29/22 2217 98 °F (36.7 °C) 75 18 111/70 98 %   11/1935 97.8 °F (36.6 °C) 68 16 102/62 97 %   11/29/22 1502 98.2 °F (36.8 °C) 63 16 124/63 97 %       Oxygen Therapy  SpO2: 90 %  Pulse Oximetry Type: Intermittent  Pulse Oximeter Device Mode: Intermittent  O2 Device: None (Room air)  Oxygen Therapy: None (Room air)    Estimated body mass index is 19.15 kg/m² as calculated from the following:    Height as of an earlier encounter on 11/22/22: 5' 2\" (1.575 m). Weight as of this encounter: 104 lb 11.2 oz (47.5 kg). Intake/Output Summary (Last 24 hours) at 11/30/2022 1345  Last data filed at 11/30/2022 0830  Gross per 24 hour   Intake 370 ml   Output 301 ml   Net 69 ml         Physical Exam:    General:    Well nourished.   No overt distress  Head:  Normocephalic,

## 2022-11-30 NOTE — PROGRESS NOTES
DISCHARGE - OUT REPORT:    Verbal report given to SAUL Taylor on [address] [place]  being discharged to [facility]. Report consisted of patient's Situation, Background, Assessment and   Recommendations(SBAR). Information from the following report(s) Nurse Handoff Report, Adult Overview, MAR, and Recent Results was reviewed with the receiving nurse. Opportunity for questions and clarification was provided. Patient to be transported via ambulance with transport scheduled for [phone].

## 2022-11-30 NOTE — CARE COORDINATION
OK to d/c today per MD. Pt now off isolation and rapid covid test ordered by RN/ per MD, Pau Garsia. Pt will go to room 326B at [facility] and report line [phone]. Transport placed for  at 3:45pm and CM to get packet completed and will continue to follow for d/c needs. Spoke with Daughter, Rose Bowie and her spouse. They agree with POC for d/c to [facility].

## 2022-11-30 NOTE — FLOWSHEET NOTE
Patient on room air. Safety measures noted. Will continue to monitor per policy. 11/30/22 0708   Handoff   Communication Given Shift Handoff   Handoff Received From SAUL [place]   Handoff Communication Face to Face; At bedside   Time Handoff Given 0328

## 2022-12-01 ENCOUNTER — CARE COORDINATION (OUTPATIENT)
Dept: CARE COORDINATION | Facility: CLINIC | Age: 87
End: 2022-12-01

## 2022-12-01 NOTE — CARE COORDINATION
No ADRIAN call indicated at this time due to patient being discharged to a SNF preferred provider network ([facility]) for STR. Will forward to SNF RN Coordinator to include in weekly care coordination call.

## 2022-12-01 NOTE — CARE COORDINATION
Chart reviewed. Notified [address] [facility] IDT of admission for STR. Will follow weekly progress.

## 2022-12-08 ENCOUNTER — CARE COORDINATION (OUTPATIENT)
Dept: CARE COORDINATION | Facility: CLINIC | Age: 87
End: 2022-12-08

## 2022-12-08 NOTE — CARE COORDINATION
Post Acute Facility Update     *The information contained in this note was received during a weekly care coordination call with the post acute facility*    [facility]  Anticipated Discharge Date: TBD    Facility Nursing Update: 12/6/22 CBC; CMP; Lipid Profile; Magnesium; Once - One Time  CBC W/DIFFERENTIAL  Once - One Time  CMP COMP METABOLIC  Once - One Time  LIPID PANEL  Once - One Time  MAGNESIUM  Once - One Time  V/S stable, reg diet. Facility Social Work Update: Care team meeting schedule TBD - Primary Caregivers/family have been battling the flu  Bundle Program Status: none  Upper Extremity Assistance: Contact Guard Assist - hands on patient for balance   Lower Extremity Assistance: Minimal Assistance   Bed Mobility: Contact Guard Assist - hands on patient for balance   Transfers: Contact Guard Assist - hands on patient for balance   Ambulation: Contact Guard Assist - hands on patient for balance   How far (in feet) is the patient ambulating? 300  Device Used: RW  Barriers to Discharge: Both caregivers/family members have the flu. Working with family to determine a discharge date soon.   Other: n/a

## 2022-12-15 ENCOUNTER — CARE COORDINATION (OUTPATIENT)
Dept: CARE COORDINATION | Facility: CLINIC | Age: 87
End: 2022-12-15

## 2022-12-15 NOTE — CARE COORDINATION
Post Acute Facility Update     *The information contained in this note was received during a weekly care coordination call with the post acute facility*    Current Facility: [facility] ()  Anticipated Discharge Date: 12/20/22    Facility Nursing Update:   Facility Social Work Update: Home HH TBD  Bundle Program Status: none  Upper Extremity Assistance: Contact Guard Assist - hands on patient for balance   Lower Extremity Assistance: Contact Guard Assist - hands on patient for balance   Bed Mobility: Contact Guard Assist - hands on patient for balance   Transfers: Contact Guard Assist - hands on patient for balance   Ambulation: Contact Guard Assist - hands on patient for balance   How far (in feet) is the patient ambulating? 300  Device Used: NO AD  Barriers to Discharge: Family has been dealing with sickness, flu and unable to assist patient at this time.  Working with family on d/c plan  Other: n/a

## 2022-12-21 ENCOUNTER — CARE COORDINATION (OUTPATIENT)
Dept: CARE COORDINATION | Facility: CLINIC | Age: 87
End: 2022-12-21

## 2022-12-21 NOTE — CARE COORDINATION
Post Acute Facility Update     *The information contained in this note was received during a weekly care coordination call with the post acute facility*    Current Facility: [facility] ([facility])  Anticipated Discharge Date: 12/20/22    Facility Nursing Update: No Nursing concerns. Facility Social Work Update: [address] [facility] [address] [facility]  Bundle Program Status: none  Upper Extremity Assistance: Stand by Assist - Presence and Cueing  Lower Extremity Assistance: Contact Guard Assist - hands on patient for balance   Bed Mobility: Stand by Assist - Presence and Cueing  Transfers: Stand by Assist - Presence and Cueing  Ambulation: Contact Guard Assist - hands on patient for balance   How far (in feet) is the patient ambulating?  300  Device Used: RW  Barriers to Discharge: none  Other: n/a

## 2022-12-23 PROBLEM — J10.1 INFLUENZA A: Status: RESOLVED | Noted: 2022-11-23 | Resolved: 2022-12-23

## 2022-12-28 ENCOUNTER — HOSPITAL ENCOUNTER (EMERGENCY)
Age: 87
Discharge: HOME | End: 2022-12-28
Attending: EMERGENCY MEDICINE
Payer: MEDICARE

## 2022-12-28 VITALS
HEART RATE: 64 BPM | BODY MASS INDEX: 20.98 KG/M2 | DIASTOLIC BLOOD PRESSURE: 70 MMHG | TEMPERATURE: 98.4 F | HEIGHT: 62 IN | OXYGEN SATURATION: 96 % | RESPIRATION RATE: 17 BRPM | SYSTOLIC BLOOD PRESSURE: 158 MMHG | WEIGHT: 114 LBS

## 2022-12-28 DIAGNOSIS — Z71.1 FEARED CONDITION NOT DEMONSTRATED: Primary | ICD-10-CM

## 2022-12-28 LAB
ALBUMIN SERPL-MCNC: 4.1 G/DL (ref 3.2–4.6)
ALBUMIN/GLOB SERPL: 1.4 {RATIO} (ref 0.4–1.6)
ALP SERPL-CCNC: 84 U/L (ref 45–117)
ALT SERPL-CCNC: 29 U/L (ref 13–61)
ANION GAP SERPL CALC-SCNC: 11 MMOL/L (ref 2–11)
AST SERPL-CCNC: 37 U/L (ref 15–37)
BASOPHILS # BLD: 0 K/UL (ref 0–0.2)
BASOPHILS NFR BLD: 1 % (ref 0–2)
BILIRUB SERPL-MCNC: 0.2 MG/DL (ref 0.2–1.1)
BUN SERPL-MCNC: 13 MG/DL (ref 7–18)
CALCIUM SERPL-MCNC: 9.7 MG/DL (ref 8.3–10.4)
CHLORIDE SERPL-SCNC: 102 MMOL/L (ref 98–107)
CO2 SERPL-SCNC: 30 MMOL/L (ref 21–32)
CREAT SERPL-MCNC: 0.79 MG/DL (ref 0.6–1)
DIFFERENTIAL METHOD BLD: ABNORMAL
EOSINOPHIL # BLD: 0.1 K/UL (ref 0–0.8)
EOSINOPHIL NFR BLD: 3 % (ref 0.5–7.8)
ERYTHROCYTE [DISTWIDTH] IN BLOOD BY AUTOMATED COUNT: 14.7 % (ref 11.9–14.6)
GLOBULIN SER CALC-MCNC: 3 G/DL (ref 2.8–4.5)
GLUCOSE SERPL-MCNC: 99 MG/DL (ref 65–100)
HCT VFR BLD AUTO: 35 % (ref 35.8–46.3)
HGB BLD-MCNC: 11.2 G/DL (ref 11.7–15.4)
IMM GRANULOCYTES # BLD AUTO: 0 K/UL (ref 0–0.5)
IMM GRANULOCYTES NFR BLD AUTO: 1 % (ref 0–5)
LYMPHOCYTES # BLD: 1.5 K/UL (ref 0.5–4.6)
LYMPHOCYTES NFR BLD: 36 % (ref 13–44)
MCH RBC QN AUTO: 28.9 PG (ref 26.1–32.9)
MCHC RBC AUTO-ENTMCNC: 32 G/DL (ref 31.4–35)
MCV RBC AUTO: 90.4 FL (ref 82–102)
MONOCYTES # BLD: 0.5 K/UL (ref 0.1–1.3)
MONOCYTES NFR BLD: 12 % (ref 4–12)
NEUTS SEG # BLD: 2 K/UL (ref 1.7–8.2)
NEUTS SEG NFR BLD: 47 % (ref 43–78)
NRBC # BLD: 0 K/UL (ref 0–0.2)
PLATELET # BLD AUTO: 218 K/UL (ref 150–450)
PMV BLD AUTO: 10.6 FL (ref 9.4–12.3)
POTASSIUM SERPL-SCNC: 4.6 MMOL/L (ref 3.5–5.1)
PROT SERPL-MCNC: 7.1 G/DL (ref 6.4–8.2)
RBC # BLD AUTO: 3.87 M/UL (ref 4.05–5.2)
SODIUM SERPL-SCNC: 143 MMOL/L (ref 133–143)
WBC # BLD AUTO: 4.2 K/UL (ref 4.3–11.1)

## 2022-12-28 PROCEDURE — 80053 COMPREHEN METABOLIC PANEL: CPT

## 2022-12-28 PROCEDURE — 99284 EMERGENCY DEPT VISIT MOD MDM: CPT

## 2022-12-28 PROCEDURE — 85025 COMPLETE CBC W/AUTO DIFF WBC: CPT

## 2022-12-28 ASSESSMENT — PAIN - FUNCTIONAL ASSESSMENT: PAIN_FUNCTIONAL_ASSESSMENT: 0-10

## 2022-12-28 ASSESSMENT — PAIN SCALES - GENERAL: PAINLEVEL_OUTOF10: 0

## 2022-12-28 NOTE — ED TRIAGE NOTES
Patient to triage via EMS from [facility] off [address] for abnormal labs (hyperkalemia, 8.3). Pt states she is asymptomatic.      EMS:   BP: 164/76  HR:72  RR:16  02:99 RA  BGL:94  Temp: 98.0

## 2022-12-28 NOTE — ED NOTES
I have reviewed discharge instructions with the patient. The patient verbalized understanding. Patient left ED via Discharge Method: wheelchair to [address] with son. Opportunity for questions and clarification provided. Patient given 0 scripts. To continue your aftercare when you leave the hospital, you may receive an automated call from our care team to check in on how you are doing. This is a free service and part of our promise to provide the best care and service to meet your aftercare needs.  If you have questions, or wish to unsubscribe from this service please call [phone]. Thank you for Choosing our [facility] Emergency Department.         David Gomez RN  12/28/22 6362
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
## 2023-06-26 ENCOUNTER — APPOINTMENT (OUTPATIENT)
Dept: CT IMAGING | Age: 88
End: 2023-06-26
Payer: MEDICARE

## 2023-06-26 ENCOUNTER — HOSPITAL ENCOUNTER (EMERGENCY)
Age: 88
Discharge: HOME | End: 2023-06-26
Attending: EMERGENCY MEDICINE
Payer: MEDICARE

## 2023-06-26 VITALS
TEMPERATURE: 98.2 F | DIASTOLIC BLOOD PRESSURE: 117 MMHG | BODY MASS INDEX: 20.8 KG/M2 | WEIGHT: 113 LBS | RESPIRATION RATE: 18 BRPM | OXYGEN SATURATION: 99 % | HEART RATE: 67 BPM | HEIGHT: 62 IN | SYSTOLIC BLOOD PRESSURE: 161 MMHG
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
## 2023-06-26 LAB
ANION GAP SERPL CALC-SCNC: 3 MMOL/L (ref 2–11)
BASOPHILS # BLD: 0 K/UL (ref 0–0.2)
BASOPHILS NFR BLD: 0 % (ref 0–2)
BILIRUB UR QL: NEGATIVE
BUN SERPL-MCNC: 14 MG/DL (ref 8–23)
CALCIUM SERPL-MCNC: 10.2 MG/DL (ref 8.3–10.4)
CHLORIDE SERPL-SCNC: 104 MMOL/L (ref 101–110)
CO2 SERPL-SCNC: 28 MMOL/L (ref 21–32)
CREAT SERPL-MCNC: 0.8 MG/DL (ref 0.6–1)
DIFFERENTIAL METHOD BLD: ABNORMAL
EKG ATRIAL RATE: 60 BPM
EKG DIAGNOSIS: NORMAL
EKG P AXIS: 49 DEGREES
EKG P-R INTERVAL: 236 MS
EKG Q-T INTERVAL: 464 MS
EKG QRS DURATION: 121 MS
EKG QTC CALCULATION (BAZETT): 464 MS
EKG R AXIS: 52 DEGREES
EKG T AXIS: 66 DEGREES
EKG VENTRICULAR RATE: 60 BPM
EOSINOPHIL # BLD: 0.1 K/UL (ref 0–0.8)
EOSINOPHIL NFR BLD: 2 % (ref 0.5–7.8)
ERYTHROCYTE [DISTWIDTH] IN BLOOD BY AUTOMATED COUNT: 15.1 % (ref 11.9–14.6)
GLUCOSE SERPL-MCNC: 85 MG/DL (ref 65–100)
GLUCOSE UR QL STRIP.AUTO: NEGATIVE MG/DL
HCT VFR BLD AUTO: 37.5 % (ref 35.8–46.3)
HGB BLD-MCNC: 12 G/DL (ref 11.7–15.4)
IMM GRANULOCYTES # BLD AUTO: 0 K/UL (ref 0–0.5)
IMM GRANULOCYTES NFR BLD AUTO: 0 % (ref 0–5)
KETONES UR-MCNC: NEGATIVE MG/DL
LEUKOCYTE ESTERASE UR QL STRIP: NEGATIVE
LYMPHOCYTES # BLD: 1.5 K/UL (ref 0.5–4.6)
LYMPHOCYTES NFR BLD: 26 % (ref 13–44)
MCH RBC QN AUTO: 29.1 PG (ref 26.1–32.9)
MCHC RBC AUTO-ENTMCNC: 32 G/DL (ref 31.4–35)
MCV RBC AUTO: 91 FL (ref 82–102)
MONOCYTES # BLD: 0.6 K/UL (ref 0.1–1.3)
MONOCYTES NFR BLD: 10 % (ref 4–12)
NEUTS SEG # BLD: 3.7 K/UL (ref 1.7–8.2)
NEUTS SEG NFR BLD: 62 % (ref 43–78)
NITRITE UR QL: NEGATIVE
NRBC # BLD: 0 K/UL (ref 0–0.2)
PH UR: 8.5 (ref 5–9)
PLATELET # BLD AUTO: 234 K/UL (ref 150–450)
PMV BLD AUTO: 11.2 FL (ref 9.4–12.3)
POTASSIUM SERPL-SCNC: 3.4 MMOL/L (ref 3.5–5.1)
PROT UR QL: NEGATIVE MG/DL
RBC # BLD AUTO: 4.12 M/UL (ref 4.05–5.2)
RBC # UR STRIP: ABNORMAL
SERVICE CMNT-IMP: ABNORMAL
SODIUM SERPL-SCNC: 135 MMOL/L (ref 133–143)
SP GR UR: 1.02 (ref 1–1.02)
UROBILINOGEN UR QL: 0.2 EU/DL (ref 0.2–1)
WBC # BLD AUTO: 5.9 K/UL (ref 4.3–11.1)

## 2023-06-26 PROCEDURE — 99284 EMERGENCY DEPT VISIT MOD MDM: CPT

## 2023-06-26 PROCEDURE — 72125 CT NECK SPINE W/O DYE: CPT

## 2023-06-26 PROCEDURE — 93005 ELECTROCARDIOGRAM TRACING: CPT | Performed by: EMERGENCY MEDICINE

## 2023-06-26 PROCEDURE — 81003 URINALYSIS AUTO W/O SCOPE: CPT

## 2023-06-26 PROCEDURE — 85025 COMPLETE CBC W/AUTO DIFF WBC: CPT

## 2023-06-26 PROCEDURE — 70450 CT HEAD/BRAIN W/O DYE: CPT

## 2023-06-26 PROCEDURE — 80048 BASIC METABOLIC PNL TOTAL CA: CPT

## 2023-06-26 ASSESSMENT — PAIN SCALES - GENERAL: PAINLEVEL_OUTOF10: 6

## 2023-06-26 ASSESSMENT — LIFESTYLE VARIABLES
HOW MANY STANDARD DRINKS CONTAINING ALCOHOL DO YOU HAVE ON A TYPICAL DAY: PATIENT DOES NOT DRINK
HOW OFTEN DO YOU HAVE A DRINK CONTAINING ALCOHOL: NEVER

## 2023-06-26 ASSESSMENT — PAIN DESCRIPTION - LOCATION: LOCATION: NECK

## 2023-06-27 ENCOUNTER — CARE COORDINATION (OUTPATIENT)
Dept: CARE COORDINATION | Facility: CLINIC | Age: 88
End: 2023-06-27

## 2024-03-02 ENCOUNTER — HOSPITAL ENCOUNTER (EMERGENCY)
Age: 89
Discharge: HOME | End: 2024-03-02
Attending: EMERGENCY MEDICINE
Payer: MEDICARE

## 2024-03-02 ENCOUNTER — APPOINTMENT (OUTPATIENT)
Dept: GENERAL RADIOLOGY | Age: 89
End: 2024-03-02
Payer: MEDICARE

## 2024-03-02 VITALS
DIASTOLIC BLOOD PRESSURE: 87 MMHG | TEMPERATURE: 98.4 F | BODY MASS INDEX: 21.23 KG/M2 | HEART RATE: 58 BPM | OXYGEN SATURATION: 97 % | HEIGHT: 62 IN | RESPIRATION RATE: 14 BRPM | SYSTOLIC BLOOD PRESSURE: 145 MMHG | WEIGHT: 115.4 LBS

## 2024-03-02 DIAGNOSIS — M54.2 NECK PAIN: Primary | ICD-10-CM

## 2024-03-02 DIAGNOSIS — G44.209 TENSION HEADACHE: ICD-10-CM

## 2024-03-02 DIAGNOSIS — I10 ESSENTIAL HYPERTENSION: ICD-10-CM

## 2024-03-02 LAB
EKG ATRIAL RATE: 65 BPM
EKG DIAGNOSIS: NORMAL
EKG P AXIS: 54 DEGREES
EKG P-R INTERVAL: 236 MS
EKG Q-T INTERVAL: 470 MS
EKG QRS DURATION: 120 MS
EKG QTC CALCULATION (BAZETT): 489 MS
EKG R AXIS: 56 DEGREES
EKG T AXIS: 65 DEGREES
EKG VENTRICULAR RATE: 65 BPM

## 2024-03-02 PROCEDURE — 6370000000 HC RX 637 (ALT 250 FOR IP): Performed by: EMERGENCY MEDICINE

## 2024-03-02 PROCEDURE — 93005 ELECTROCARDIOGRAM TRACING: CPT | Performed by: EMERGENCY MEDICINE

## 2024-03-02 PROCEDURE — 99284 EMERGENCY DEPT VISIT MOD MDM: CPT

## 2024-03-02 PROCEDURE — 93010 ELECTROCARDIOGRAM REPORT: CPT | Performed by: INTERNAL MEDICINE

## 2024-03-02 PROCEDURE — 72040 X-RAY EXAM NECK SPINE 2-3 VW: CPT

## 2024-03-02 RX ORDER — LOPERAMIDE HYDROCHLORIDE 2 MG/1
2 CAPSULE ORAL 4 TIMES DAILY PRN
COMMUNITY

## 2024-03-02 RX ORDER — IBUPROFEN 400 MG/1
400 TABLET ORAL
Status: COMPLETED | OUTPATIENT
Start: 2024-03-02 | End: 2024-03-02

## 2024-03-02 RX ORDER — SENNOSIDES A AND B 8.6 MG/1
1 TABLET, FILM COATED ORAL DAILY
COMMUNITY

## 2024-03-02 RX ORDER — LOSARTAN POTASSIUM 50 MG/1
25 TABLET ORAL
Status: COMPLETED | OUTPATIENT
Start: 2024-03-02 | End: 2024-03-02

## 2024-03-02 RX ORDER — LISINOPRIL 2.5 MG/1
2.5 TABLET ORAL DAILY
COMMUNITY

## 2024-03-02 RX ORDER — LIDOCAINE 4 G/G
1 PATCH TOPICAL DAILY
Qty: 14 EACH | Refills: 0 | Status: SHIPPED | OUTPATIENT
Start: 2024-03-02 | End: 2024-03-16

## 2024-03-02 RX ORDER — IBUPROFEN 400 MG/1
400 TABLET ORAL EVERY 8 HOURS PRN
Qty: 21 TABLET | Refills: 0 | Status: SHIPPED | OUTPATIENT
Start: 2024-03-02 | End: 2024-03-09

## 2024-03-02 RX ORDER — POTASSIUM CHLORIDE 1.5 G/1.58G
20 POWDER, FOR SOLUTION ORAL DAILY
COMMUNITY

## 2024-03-02 RX ADMIN — LOSARTAN POTASSIUM 25 MG: 50 TABLET, FILM COATED ORAL at 09:46

## 2024-03-02 RX ADMIN — IBUPROFEN 400 MG: 400 TABLET, FILM COATED ORAL at 09:08

## 2024-03-02 ASSESSMENT — ENCOUNTER SYMPTOMS
GASTROINTESTINAL NEGATIVE: 1
BACK PAIN: 0
RESPIRATORY NEGATIVE: 1

## 2024-03-02 ASSESSMENT — LIFESTYLE VARIABLES
HOW OFTEN DO YOU HAVE A DRINK CONTAINING ALCOHOL: NEVER
HOW MANY STANDARD DRINKS CONTAINING ALCOHOL DO YOU HAVE ON A TYPICAL DAY: PATIENT DOES NOT DRINK

## 2024-03-02 ASSESSMENT — PAIN SCALES - GENERAL: PAINLEVEL_OUTOF10: 10

## 2024-03-02 ASSESSMENT — PAIN DESCRIPTION - LOCATION: LOCATION: NECK;HEAD

## 2024-03-02 ASSESSMENT — PAIN - FUNCTIONAL ASSESSMENT: PAIN_FUNCTIONAL_ASSESSMENT: 0-10

## 2024-03-02 NOTE — DISCHARGE INSTRUCTIONS
Use lidocaine patch and ibuprofen for neck pain.  Monitor your blood pressure.  Take your blood pressure medication.  Return for any worsening symptoms.  Follow-up with your doctor for checkup.  I gave you a dose of losartan 25 mg orally here today.

## 2024-03-02 NOTE — ED TRIAGE NOTES
Pt arrives by ems from [facility] living with neck and head pain since yesterday morning, states pain is 10/10. Denies falls. Some slight right arm pain as well. Per EMS 12 lead was no STEMI. HTN for /90.

## 2024-03-02 NOTE — ED PROVIDER NOTES
Emergency Department Provider Note       PCP: File, Not On, MD   Age: 88 y.o.   Sex: female     DISPOSITION Decision To Discharge 03/02/2024 10:07:49 AM       ICD-10-CM    1. Neck pain  M54.2       2. Tension headache  G44.209       3. Essential hypertension  I10           Medical Decision Making     Suspect likely musculoskeletal neck pain.  Will give ibuprofen.  Will obtain x-ray of the neck.  Neuroexam intact.  Do not suspect intracranial hemorrhage, posterior circulation stroke, CVA or TIA.  ED Course as of 03/02/24 1011   Sat Mar 02, 2024   0942 Cervical spine x-ray showing degenerative changes without any acute fracture.  This is something musculoskeletal neck pain.  Blood pressure is elevated.  She has not taken her home blood pressure medication.  Appears she has lisinopril and losartan listed on her medical record.  Will give 25 mg of losartan. [DT]      ED Course User Index  [DT] Ta, Cristo Payan MD     10:11 AM  X-ray without any acute fracture or dislocation.  Arthritic changes noted.  Neck pain better with ibuprofen.  Losartan 25 mg oral given for blood pressure.  It is also improved.  Recommend follow-up with PCP for repeat evaluation of blood pressure check.  Recommend ibuprofen 400 mg every 6-8 hours as needed for neck pain.  Recommend lidocaine patch for neck pain.  No carotid bruit.  Low suspicion for dissection.  1 acute complicated illness or injury.  Prescription drug management performed.  Shared medical decision making was utilized in creating the patients health plan today.    I independently ordered and reviewed each unique test.       I interpreted the X-rays no acute fracture or dislocation noted.  My Independent EKG Interpretation: sinus rhythm, no evidence of arrhythmia      ST Segments:Normal ST segments - NO STEMI   Rate:  65.  Nonspecific IVCD.            History     HPI  88 female from nursing home is here with complaint of neck pain.  Started yesterday.  Left-sided.  Progressively  Diagnosis       Sinus rhythm  Prolonged IN interval  Nonspecific intraventricular conduction delay  Anterior infarct, old           XR CERVICAL SPINE (2-3 VIEWS)   Final Result   Moderate degenerative changes C5-6 and C6-7.      No plain film evidence of acute cervical spine injury.                   No results for input(s): \"COVID19\" in the last 72 hours.    Voice dictation software was used during the making of this note.  This software is not perfect and grammatical and other typographical errors may be present.  This note has not been completely proofread for errors.     Ta, Cristo MD [place]  03/02/24 1011

## 2024-03-04 ENCOUNTER — NURSE TRIAGE (OUTPATIENT)
Dept: OTHER | Facility: CLINIC | Age: 89
End: 2024-03-04

## 2024-03-04 NOTE — TELEPHONE ENCOUNTER
Location of patient: [place]    Received call from [place] at [facility]; Patient with Red Flag Complaint requesting to establish care with Keesha Ty.    Caller reports additional contact: Cory Wagoner [phone]     Subjective: Caller states \"188/90 BP\"     Pt not present at time of call, triage limited. Pt lives in a nursing home.     Transported to hospital on Saturday for high blood pressure, neck and head pain- released a few hours later and \"was under control at that time\". Requesting to set up appt with Dr. Montoya.    Hx stroke per caller    Caller connected with Cathy at [facility] for appt scheduling to establish care.     Caller denies any other questions or concerns; instructed to call back for any new or worsening symptoms.      Attention Provider:  Thank you for allowing me to participate in the care of your patient.  The patient was connected to triage in response to information provided to the [facility].  Please do not respond through this encounter as the response is not directed to a shared pool.  Reason for Disposition   Caller has already spoken with the PCP (or office), and has no further questions     ED 3/2    Protocols used: No Contact or Duplicate Contact Call-ADULT-[place]

## 2024-10-26 ENCOUNTER — APPOINTMENT (OUTPATIENT)
Dept: CT IMAGING | Age: 89
End: 2024-10-26
Payer: MEDICARE

## 2024-10-26 ENCOUNTER — HOSPITAL ENCOUNTER (EMERGENCY)
Age: 89
Discharge: HOME | End: 2024-10-26
Attending: EMERGENCY MEDICINE
Payer: MEDICARE

## 2024-10-26 VITALS
HEIGHT: 62 IN | DIASTOLIC BLOOD PRESSURE: 71 MMHG | BODY MASS INDEX: 21.9 KG/M2 | TEMPERATURE: 98.3 F | HEART RATE: 65 BPM | SYSTOLIC BLOOD PRESSURE: 154 MMHG | OXYGEN SATURATION: 95 % | WEIGHT: 119 LBS | RESPIRATION RATE: 16 BRPM

## 2024-10-26 DIAGNOSIS — R55 SYNCOPE AND COLLAPSE: ICD-10-CM

## 2024-10-26 DIAGNOSIS — S01.01XA LACERATION OF SCALP, INITIAL ENCOUNTER: Primary | ICD-10-CM

## 2024-10-26 LAB
ANION GAP SERPL CALC-SCNC: 15 MMOL/L (ref 9–18)
BASOPHILS # BLD: 0 K/UL (ref 0–0.2)
BASOPHILS NFR BLD: 0 % (ref 0–2)
BUN SERPL-MCNC: 15 MG/DL (ref 8–23)
CALCIUM SERPL-MCNC: 9.9 MG/DL (ref 8.3–10.4)
CHLORIDE SERPL-SCNC: 95 MMOL/L (ref 98–107)
CO2 SERPL-SCNC: 22 MMOL/L (ref 21–32)
CREAT SERPL-MCNC: 1.16 MG/DL (ref 0.6–1)
DIFFERENTIAL METHOD BLD: ABNORMAL
EOSINOPHIL # BLD: 0 K/UL (ref 0–0.8)
EOSINOPHIL NFR BLD: 0 % (ref 0.5–7.8)
ERYTHROCYTE [DISTWIDTH] IN BLOOD BY AUTOMATED COUNT: 13.6 % (ref 11.9–14.6)
GLUCOSE SERPL-MCNC: 100 MG/DL (ref 65–100)
HCT VFR BLD AUTO: 37.6 % (ref 35.8–46.3)
HGB BLD-MCNC: 12.4 G/DL (ref 11.7–15.4)
IMM GRANULOCYTES # BLD AUTO: 0 K/UL (ref 0–0.5)
IMM GRANULOCYTES NFR BLD AUTO: 0 % (ref 0–5)
LYMPHOCYTES # BLD: 0.7 K/UL (ref 0.5–4.6)
LYMPHOCYTES NFR BLD: 21 % (ref 13–44)
MCH RBC QN AUTO: 28.6 PG (ref 26.1–32.9)
MCHC RBC AUTO-ENTMCNC: 33 G/DL (ref 31.4–35)
MCV RBC AUTO: 86.6 FL (ref 82–102)
MONOCYTES # BLD: 0.5 K/UL (ref 0.1–1.3)
MONOCYTES NFR BLD: 16 % (ref 4–12)
NEUTS SEG # BLD: 2 K/UL (ref 1.7–8.2)
NEUTS SEG NFR BLD: 62 % (ref 43–78)
NRBC # BLD: 0 K/UL (ref 0–0.2)
PLATELET # BLD AUTO: 215 K/UL (ref 150–450)
PMV BLD AUTO: 10.5 FL (ref 9.4–12.3)
POTASSIUM SERPL-SCNC: 4.2 MMOL/L (ref 3.5–5.1)
RBC # BLD AUTO: 4.34 M/UL (ref 4.05–5.2)
SODIUM SERPL-SCNC: 132 MMOL/L (ref 133–143)
WBC # BLD AUTO: 3.1 K/UL (ref 4.3–11.1)

## 2024-10-26 PROCEDURE — 99284 EMERGENCY DEPT VISIT MOD MDM: CPT

## 2024-10-26 PROCEDURE — 72125 CT NECK SPINE W/O DYE: CPT

## 2024-10-26 PROCEDURE — 70450 CT HEAD/BRAIN W/O DYE: CPT

## 2024-10-26 PROCEDURE — 85025 COMPLETE CBC W/AUTO DIFF WBC: CPT

## 2024-10-26 PROCEDURE — 12001 RPR S/N/AX/GEN/TRNK 2.5CM/<: CPT

## 2024-10-26 PROCEDURE — 80048 BASIC METABOLIC PNL TOTAL CA: CPT

## 2024-10-26 ASSESSMENT — PAIN DESCRIPTION - LOCATION
LOCATION: HEAD
LOCATION: HEAD

## 2024-10-26 ASSESSMENT — PAIN SCALES - GENERAL
PAINLEVEL_OUTOF10: 9
PAINLEVEL_OUTOF10: 5

## 2024-10-26 ASSESSMENT — PAIN - FUNCTIONAL ASSESSMENT
PAIN_FUNCTIONAL_ASSESSMENT: 0-10
PAIN_FUNCTIONAL_ASSESSMENT: 0-10

## 2024-10-26 NOTE — ED TRIAGE NOTES
Pt arries by ems [facility] living states she was getting into shower got dizzy fell backwards hitting back of  head. Ems states she was afib on monitor, denies hx of afib or blood thinner use. States she remembers this happening and denies loc. CO head pain currently. Ems states orthostatics were negative. BGL 96. Pt A&O x4 in triage.

## 2024-10-26 NOTE — ED NOTES
Patient mobility status  with difficulty, uses a walker. Provider aware     I have reviewed discharge instructions with the patient and Son in law.  The patient and son in law verbalized understanding.    Patient left ED via Discharge Method: wheelchair to Home with Extended Family:.    Opportunity for questions and clarification provided.     Patient given 0 scripts.

## 2024-10-26 NOTE — ED PROVIDER NOTES
Emergency Department Provider Note       PCP: File, Not On, MD   Age: 89 y.o.   Sex: female     DISPOSITION Decision To Discharge 10/26/2024 04:18:02 PM            ICD-10-CM    1. Laceration of scalp, initial encounter  S01.01XA       2. Syncope and collapse  R55           Medical Decision Making     All laboratory values reviewed by me.  Imaging reviewed by me.  No clinically significant acute abnormality identified.  No traumatic injury on CT imaging.  Patient has remained hemodynamically stable in the emergency department for several hours.  This is not a new problem.  I feel continued management the outpatient setting is prudent.  Little concern for central neurocardiogenic etiology.  Heart return he says they have been adjusting her blood pressure medications over the last several months that may be playing a role.  Laceration of the occipital scalp was repaired in the standard fashion.  Strict return precautions provided.     1 acute complicated illness or injury.  Shared medical decision making was utilized in creating the patients health plan today.    I independently ordered and reviewed each unique test.  I reviewed external records: ED visit note from an outside group.  I reviewed external records: provider visit note from PCP.  I reviewed external records: provider visit note from outside specialist.                   History     Patient presents to the emergency department the chief complaint of occipital scalp laceration after a fall.  She was at her facility today when she became dizzy and fell to the ground in the shower.  Patient's power of  is currently present states she has these episodes of dizziness and collapse versus near syncope.  Here today denying any headache or neck pain.  No chest pain or shortness of breath.  She is not on any blood thinners.  Denies any nausea or vomiting.    The history is provided by the patient and a caregiver.     Physical Exam     Vitals signs and  Absolute 2.0 1.7 - 8.2 K/UL    Lymphocytes Absolute 0.7 0.5 - 4.6 K/UL    Monocytes Absolute 0.5 0.1 - 1.3 K/UL    Eosinophils Absolute 0.0 0.0 - 0.8 K/UL    Basophils Absolute 0.0 0.0 - 0.2 K/UL    Immature Granulocytes Absolute 0.0 0.0 - 0.5 K/UL   BMP   Result Value Ref Range    Sodium 132 (L) 133 - 143 mmol/L    Potassium 4.2 3.5 - 5.1 mmol/L    Chloride 95 (L) 98 - 107 mmol/L    CO2 22 21 - 32 mmol/L    Anion Gap 15 9 - 18 mmol/L    Glucose 100 65 - 100 mg/dL    BUN 15 8 - 23 MG/DL    Creatinine 1.16 (H) 0.6 - 1.0 MG/DL    Est, Glom Filt Rate 45 (L) >60 ml/min/1.73m2    Calcium 9.9 8.3 - 10.4 MG/DL         CT Head W/O Contrast   Final Result   No CT evidence of acute intracranial abnormality.      Electronically signed by Harry Hunt      CT CSpine W/O Contrast   Final Result   No acute finding.      Electronically signed by Harry Hunt         NIH Stroke Scale  Interval: Baseline  Level of Consciousness (1a): Alert  LOC Questions (1b): Answers both correctly  LOC Commands (1c): Performs both tasks correctly  Best Gaze (2): Normal  Visual (3): No visual loss  Facial Palsy (4): Normal symmetrical movement  Motor Arm, Left (5a): No drift  Motor Arm, Right (5b): No drift  Motor Leg, Left (6a): No drift  Motor Leg, Right (6b): No drift  Limb Ataxia (7): (!) Present in two limbs  Sensory (8): Normal  Best Language (9): No aphasia  Dysarthria (10): Normal  Extinction and Inattention (11): No abnormality  Total: 2         No results for input(s): \"COVID19\" in the last 72 hours.     Voice dictation software was used during the making of this note.  This software is not perfect and grammatical and other typographical errors may be present.  This note has not been completely proofread for errors.     Ricky [facility] MD  10/26/24 1893

## 2025-04-30 ENCOUNTER — APPOINTMENT (OUTPATIENT)
Dept: CT IMAGING | Age: 89
End: 2025-04-30
Payer: MEDICARE

## 2025-04-30 ENCOUNTER — HOSPITAL ENCOUNTER (EMERGENCY)
Age: 89
Discharge: HOME | End: 2025-04-30
Attending: EMERGENCY MEDICINE
Payer: MEDICARE

## 2025-04-30 VITALS
DIASTOLIC BLOOD PRESSURE: 79 MMHG | BODY MASS INDEX: 20.24 KG/M2 | TEMPERATURE: 98 F | OXYGEN SATURATION: 98 % | RESPIRATION RATE: 19 BRPM | HEART RATE: 78 BPM | SYSTOLIC BLOOD PRESSURE: 114 MMHG | HEIGHT: 62 IN | WEIGHT: 110 LBS

## 2025-04-30 DIAGNOSIS — S01.01XA LACERATION OF SCALP, INITIAL ENCOUNTER: Primary | ICD-10-CM
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
## 2025-04-30 PROCEDURE — 99284 EMERGENCY DEPT VISIT MOD MDM: CPT

## 2025-04-30 PROCEDURE — 6370000000 HC RX 637 (ALT 250 FOR IP): Performed by: EMERGENCY MEDICINE

## 2025-04-30 PROCEDURE — 70450 CT HEAD/BRAIN W/O DYE: CPT

## 2025-04-30 PROCEDURE — 12001 RPR S/N/AX/GEN/TRNK 2.5CM/<: CPT

## 2025-04-30 RX ADMIN — Medication 3 ML: at 12:55

## 2025-04-30 ASSESSMENT — ENCOUNTER SYMPTOMS: SHORTNESS OF BREATH: 0

## 2025-04-30 ASSESSMENT — PAIN - FUNCTIONAL ASSESSMENT
PAIN_FUNCTIONAL_ASSESSMENT: 0-10
PAIN_FUNCTIONAL_ASSESSMENT: NONE - DENIES PAIN

## 2025-04-30 ASSESSMENT — PAIN SCALES - GENERAL
PAINLEVEL_OUTOF10: 4
PAINLEVEL_OUTOF10: 8

## 2025-04-30 NOTE — ED NOTES
Patient is on continuous pulse oximetry and cycling BP. Patient is resting in stretcher. Respirations are even and unlabored. Side rails x 2. Call light within reach. Area Clear of clutter. Bed alarm on. Fall precautions in place. Patient received two blankets for comfort.

## 2025-04-30 NOTE — DISCHARGE INSTRUCTIONS
Keep the wound clean with soap and water and apply an antibacterial ointment twice daily.      Have the sutures/staples removed in about 10-12 days.    The wound will likely bleed a little or ooze for the first 24 hours and that is okay.      Please return to the emergency department with any fevers, swelling, worsening symptoms, or additional concerns.    You can use Tylenol or ibuprofen for pain.    Follow-up with your primary care doctor as needed.

## 2025-04-30 NOTE — ED TRIAGE NOTES
Pt presents to ED via [facility] from [place]. Mechanical fall while getting out of the shower, hit head on walker. Laceration to the top of the head. Pt endorses mild neck pain but denies any other pain. No LOC. No blood thinners.

## 2025-04-30 NOTE — ED NOTES
Patient's son notified this RN that patient is complaining of left thigh pain and left arm pain. Patient able to perform ROM on LUE and LLE. No bruising or skin abrasions. MD notified.

## 2025-04-30 NOTE — ED NOTES
Patient mobility status  with no difficulty.     I have reviewed discharge instructions with the patient and son.  The patient and son verbalized understanding.    Patient left ED via Discharge Method: wheelchair to Skilled nursing facility with  son .    Opportunity for questions and clarification provided.     Patient given 0 scripts.       This RN called report to Yelena HUGHES at [facility]. D/C paperwork given to son to take to staff.